# Patient Record
Sex: FEMALE | Race: WHITE | NOT HISPANIC OR LATINO | URBAN - METROPOLITAN AREA
[De-identification: names, ages, dates, MRNs, and addresses within clinical notes are randomized per-mention and may not be internally consistent; named-entity substitution may affect disease eponyms.]

---

## 2017-02-06 ENCOUNTER — OFFICE VISIT (OUTPATIENT)
Dept: FAMILY MEDICINE CLINIC | Facility: CLINIC | Age: 37
End: 2017-02-06

## 2017-02-06 VITALS
HEIGHT: 61 IN | WEIGHT: 114 LBS | DIASTOLIC BLOOD PRESSURE: 64 MMHG | BODY MASS INDEX: 21.52 KG/M2 | SYSTOLIC BLOOD PRESSURE: 102 MMHG

## 2017-02-06 DIAGNOSIS — S76.311A RIGHT HAMSTRING MUSCLE STRAIN, INITIAL ENCOUNTER: ICD-10-CM

## 2017-02-06 DIAGNOSIS — Z00.00 ROUTINE ADULT HEALTH MAINTENANCE: Primary | ICD-10-CM

## 2017-02-06 PROCEDURE — 99395 PREV VISIT EST AGE 18-39: CPT | Performed by: FAMILY MEDICINE

## 2017-02-06 RX ORDER — CHOLECALCIFEROL (VITAMIN D3) 125 MCG
5 CAPSULE ORAL NIGHTLY
COMMUNITY

## 2017-02-06 RX ORDER — IBUPROFEN 200 MG
200 TABLET ORAL EVERY 6 HOURS PRN
COMMUNITY

## 2017-02-06 RX ORDER — DIPHENHYDRAMINE HCL 25 MG
25 TABLET ORAL EVERY 6 HOURS PRN
COMMUNITY

## 2017-02-06 NOTE — PROGRESS NOTES
"  Chief Complaint   Patient presents with   • Establish Care     New pt here to [facility] care for health maintenance. Just recently moved to [place] due to job transfer in [employer].   • Annual Exam     Here for annual CPE and fasting labs.       Subjective   Yamilex Dugan is a 36 y.o. female and is here for a yearly physical exam. The patient reports problems - right hamstring pull 3 weeks ago running.  New [facility] family, moved from [place]  She is an investigator    Do you take any herbs or supplements that were not prescribed by a doctor? yes. If so, these will be added to active medication list.    The following portions of the patient's history were reviewed and updated as appropriate: allergies, current medications, past family history, past medical history, past social history, past surgical history and problem list.    Review of Systems  Review of Systems  A comprehensive review of systems was negative.    Physical Exam    Objective   Visit Vitals   • /64   • Ht 61\" (154.9 cm)   • Wt 114 lb (51.7 kg)   • LMP 02/03/2017   • BMI 21.54 kg/m2       General Appearance:    Alert, cooperative, no distress, appears stated age   Head:    Normocephalic, without obvious abnormality, atraumatic   Eyes:    PERRL, conjunctiva/corneas clear, EOM's intact, fundi     benign, both eyes   Ears:    Normal TM's and external ear canals, both ears   Nose:   Nares normal, septum midline, mucosa normal, no drainage    or sinus tenderness   Throat:   Lips, mucosa, and tongue normal; teeth and gums normal   Neck:   Supple, symmetrical, trachea midline, no adenopathy;     thyroid:  no enlargement/tenderness/nodules; no carotid    bruit or JVD   Back:     Symmetric, no curvature, ROM normal, no CVA tenderness   Lungs:     Clear to auscultation bilaterally, respirations unlabored   Chest Wall:    No tenderness or deformity    Heart:    Regular rate and rhythm, S1 and S2 normal, no murmur, rub   or gallop   Breast Exam:     "   Abdomen:     Soft, non-tender, bowel sounds active all four quadrants,     no masses, no organomegaly   Genitalia:     Rectal:     Extremities:   Tender right proximal hamstring   Pulses:   2+ and symmetric all extremities   Skin:   Skin color, texture, turgor normal, no rashes or lesions   Lymph nodes:   Cervical, supraclavicular, and axillary nodes normal   Neurologic:   CNII-XII intact, normal strength, sensation and reflexes     throughout        No results found for this or any previous visit.  Assessment/Plan   Healthy female exam.  Yamilex was seen today for establish care and annual exam.    Diagnoses and all orders for this visit:    Routine adult health maintenance    Right hamstring muscle strain, initial encounter  -     Ambulatory Referral to Physical Therapy Evaluate and treat      1. Just had labs 6 months ago at [place], all ok  2. Patient Counseling:  --Nutrition: Stressed importance of moderation in sodium/caffeine intake, saturated fat and cholesterol.  Discussed caloric balance, sufficient intake of fresh fruits, vegetables, fiber, calcium, iron.good here  -  --Exercise: Stressed the importance of regular exercise. excellent  --Substance Abuse: Discussed cessation/primary prevention of tobacco, alcohol, or other drug use; driving or other dangerous activities under the influence. Ok here   --Dental health: Discussed importance of regular tooth brushing, flossing, and dental visits.just found a dentist  --Immunizations reviewed.utd  --  3. Discussed the patient's BMI with her.  The BMI is in the acceptable range  4. Follow up as needed for acute illness    There are no discontinued medications.     Dr. Enrique Hankins MD  [place], [place]  [facility]

## 2017-02-13 ENCOUNTER — TREATMENT (OUTPATIENT)
Dept: PHYSICAL THERAPY | Facility: CLINIC | Age: 37
End: 2017-02-13

## 2017-02-13 DIAGNOSIS — S76.311D STRAIN OF HAMSTRING MUSCLE, RIGHT, SUBSEQUENT ENCOUNTER: Primary | ICD-10-CM

## 2017-02-13 PROCEDURE — 97161 PT EVAL LOW COMPLEX 20 MIN: CPT | Performed by: PHYSICAL THERAPIST

## 2017-02-13 PROCEDURE — 97110 THERAPEUTIC EXERCISES: CPT | Performed by: PHYSICAL THERAPIST

## 2017-02-13 PROCEDURE — 97140 MANUAL THERAPY 1/> REGIONS: CPT | Performed by: PHYSICAL THERAPIST

## 2017-02-13 PROCEDURE — 97014 ELECTRIC STIMULATION THERAPY: CPT | Performed by: PHYSICAL THERAPIST

## 2017-02-13 NOTE — PROGRESS NOTES
Physical Therapy Initial Evaluation and Plan of Care    TIME IN 1:45 TIME OUT 2:46  Patient: Yamilex Dugan   : 1980  Diagnosis/ICD-10 Code:  No primary diagnosis found.  Referring practitioner: Enrique Hankins MD    Subjective Evaluation    History of Present Illness  Date of onset: 2017  Mechanism of injury: Running sprint intervals felt right hamstring tighten up - has not improved    Quality of life: excellent    Pain  Current pain ratin  At best pain ratin  At worst pain ratin  Location: right distal glut / proximal HS  Quality: dull ache (shooting)  Relieving factors: rest (light stretching)  Exacerbated by: running.  Progression: no change    Treatments  Current treatment: medication and physical therapy  Current treatment comments: 800 mg motrin.     Patient Goals  Patient goals for therapy: decreased pain, increased motion, increased strength and return to sport/leisure activities  Patient goal: [employer]           Objective     Palpation     Right Tenderness of the gluteus kenneth, gluteus medius, piriformis, proximal biceps femoris, sartorius and TFL.     Tenderness     Right Hip   Tenderness in the PSIS and sacroiliac joint.     Active Range of Motion     Right Hip   Flexion: 115 degrees   Abduction: 40 degrees   Adduction: 30 degrees   External rotation (90/90): 60 degrees   Internal rotation (90/90): 50 degrees     Strength/Myotome Testing     Right Hip   Planes of Motion   Right hip flexors strength: 5-/5.  Right hip abductors strength: 5-/5.  Adduction: 5  Right hip external rotation strength: 5-/5.  Right hip internal rotation strength: 5-/5.    Tests     Right Hip   Negative ALEXANDER, Brent, piriformis and scour.   90/90 SLR: Positive.   SLR: Positive.     Ambulation   Weight-Bearing Status   Weight-Bearing Status (Left): full weight bearing   Distance in feet: 150  Assistive device used: none    Ambulation: Level Surfaces   Ambulation without assistive  device: independent    Observational Gait   Gait: within functional limits   Walking speed and stride length within functional limits.     Quality of Movement During Gait   Trunk  Trunk within functional limits.          Assessment & Plan     Assessment  Assessment details: 36y.o female presents with 1) tender proximal HS and distal glut 2) decreased right hip ROM 3) decreased right hip strength 4) positive SLR 5) decreased tolerance to running and squatting required for work  Prognosis: good  Prognosis details: SHORT TERM GOALS:  4 weeks       1. Pt independent with HEP  2. Pt to demonstrate ramya hip strength 5/5 or greater to improve stability with ambulation  3. Pt to report being able to walk for 10 minutes without increasing pain in the right hip    LONG TERM GOALS:   8 weeks  1. Pt to demonstrate ability to perform full functional squat with good form and control of the hips and without increasing pain  2. Pt to return to work full duty without increased pain in the right hip(s)   3. Pt to demonstrate ability to perform step up/down 8 inch step x10 safely and without pain in the right hip(s)     Goals  [employer]    Plan  Therapy options: will be seen for skilled physical therapy services  Planned modality interventions: cryotherapy, electrical stimulation/[place] stimulation, ultrasound and thermotherapy (hydrocollator packs)  Other planned modality interventions: Dry Needling  Planned therapy interventions: flexibility, functional ROM exercises, home exercise program, joint mobilization, manual therapy, neuromuscular re-education, soft tissue mobilization, strengthening, stretching and therapeutic activities  Frequency: 2x week  Duration in weeks: 8  Treatment plan discussed with: patient        Manual Therapy:    8     mins  93323;  Therapeutic Exercise:    15     mins  44828;     Neuromuscular Karla:        mins  40997;    Therapeutic Activity:          mins  91039;     Gait Training:            mins  33578;     Ultrasound:          mins  20492;    Electrical Stimulation:    15     mins  11832 ( );  Dry Needling          mins self-pay    Timed Treatment:   23   mins   Total Treatment:     56   mins    PT SIGNATURE: Cassy Wilde, PT   DATE TREATMENT INITIATED: 2/13/2017    Initial Certification  Certification Period: 5/14/2017  I certify that the therapy services are furnished while this patient is under my care.  The services outlined above are required by this patient, and will be reviewed every 90 days.     PHYSICIAN: Enrique Hankins MD      DATE:     Please sign and return via fax to [phone].. Thank you, [facility].

## 2017-02-27 ENCOUNTER — TREATMENT (OUTPATIENT)
Dept: PHYSICAL THERAPY | Facility: CLINIC | Age: 37
End: 2017-02-27

## 2017-02-27 DIAGNOSIS — S76.311D STRAIN OF HAMSTRING MUSCLE, RIGHT, SUBSEQUENT ENCOUNTER: Primary | ICD-10-CM

## 2017-02-27 PROCEDURE — 97530 THERAPEUTIC ACTIVITIES: CPT | Performed by: PHYSICAL THERAPIST

## 2017-02-27 PROCEDURE — 97110 THERAPEUTIC EXERCISES: CPT | Performed by: PHYSICAL THERAPIST

## 2017-02-27 PROCEDURE — 97014 ELECTRIC STIMULATION THERAPY: CPT | Performed by: PHYSICAL THERAPIST

## 2017-02-27 NOTE — PROGRESS NOTES
Physical Therapy Daily Progress Note    Time In 3:50  Time Out 4:56    Yamilex Dugan reports: I felt really good after last session - still feel really tight    Subjective     Objective     Palpation     Right   Hypertonic in the adductor brevis and adductor longus. Tenderness of the adductor brevis, adductor longus, obturator externus, piriformis and proximal biceps femoris.      See Exercise, Manual, and Modality Logs for complete treatment.       Assessment & Plan     Assessment  Assessment details: Increased muscle tone and tenderness persist proximal HS insertion. Tolerated gentle stretching progressions well. Cont PT 1-2x per week for flexibility and strengthening - next visit attempt Dry Needling if muscle tone not improved.         Progress strengthening /stabilization /functional activity           Manual Therapy:         mins  72197;  Therapeutic Exercise:    25     mins  18233;     Neuromuscular Karla:        mins  93325;    Therapeutic Activity:     10     mins  64827;     Gait Training:           mins  24177;     Ultrasound:     8     mins  58659;    Electrical Stimulation:    15     mins  15541 ( );  Dry Needling          mins self-pay    Timed Treatment:   43  mins   Total Treatment:     58   mins    Cassy Wilde, PT  Physical Therapist  [place] License # [id number]

## 2017-03-06 ENCOUNTER — TREATMENT (OUTPATIENT)
Dept: PHYSICAL THERAPY | Facility: CLINIC | Age: 37
End: 2017-03-06

## 2017-03-06 DIAGNOSIS — S76.311D STRAIN OF HAMSTRING MUSCLE, RIGHT, SUBSEQUENT ENCOUNTER: Primary | ICD-10-CM

## 2017-03-06 PROCEDURE — 97014 ELECTRIC STIMULATION THERAPY: CPT | Performed by: PHYSICAL THERAPIST

## 2017-03-06 PROCEDURE — 97110 THERAPEUTIC EXERCISES: CPT | Performed by: PHYSICAL THERAPIST

## 2017-03-06 PROCEDURE — 97035 APP MDLTY 1+ULTRASOUND EA 15: CPT | Performed by: PHYSICAL THERAPIST

## 2017-03-06 PROCEDURE — 97140 MANUAL THERAPY 1/> REGIONS: CPT | Performed by: PHYSICAL THERAPIST

## 2017-03-13 ENCOUNTER — TREATMENT (OUTPATIENT)
Dept: PHYSICAL THERAPY | Facility: CLINIC | Age: 37
End: 2017-03-13

## 2017-03-13 DIAGNOSIS — S76.311D STRAIN OF HAMSTRING MUSCLE, RIGHT, SUBSEQUENT ENCOUNTER: Primary | ICD-10-CM

## 2017-03-13 PROCEDURE — 97110 THERAPEUTIC EXERCISES: CPT | Performed by: PHYSICAL THERAPIST

## 2017-03-13 PROCEDURE — 97014 ELECTRIC STIMULATION THERAPY: CPT | Performed by: PHYSICAL THERAPIST

## 2017-03-13 PROCEDURE — 97530 THERAPEUTIC ACTIVITIES: CPT | Performed by: PHYSICAL THERAPIST

## 2017-03-13 PROCEDURE — 97140 MANUAL THERAPY 1/> REGIONS: CPT | Performed by: PHYSICAL THERAPIST

## 2017-03-20 ENCOUNTER — TREATMENT (OUTPATIENT)
Dept: PHYSICAL THERAPY | Facility: CLINIC | Age: 37
End: 2017-03-20

## 2017-03-20 PROCEDURE — 97110 THERAPEUTIC EXERCISES: CPT | Performed by: PHYSICAL THERAPIST

## 2017-03-20 PROCEDURE — 97530 THERAPEUTIC ACTIVITIES: CPT | Performed by: PHYSICAL THERAPIST

## 2017-03-20 PROCEDURE — 97014 ELECTRIC STIMULATION THERAPY: CPT | Performed by: PHYSICAL THERAPIST

## 2017-03-20 NOTE — PROGRESS NOTES
Physical Therapy Daily Progress Note    Time In 3:40  Time Out 4:49    Yamilex Dugan reports: I have tried running but felt that pain again in my glut    Subjective     Objective     Palpation     Right Tenderness of the gluteus kenneth and gluteus medius.      See Exercise, Manual, and Modality Logs for complete treatment.       Assessment & Plan     Assessment  Assessment details: Increased muscle tone and tenderness persist proximal HS insertion. Tolerated gentle strengthening initiation well. Cont PT 1-2x per week for flexibility and strengthening - next visit attempt hip machine if tolerated - may attempt Dry Needling.         Progress strengthening /stabilization /functional activity           Manual Therapy:    8     mins  77574;  Therapeutic Exercise:    24     mins  84485;     Neuromuscular Karla:        mins  73327;    Therapeutic Activity:     10     mins  86516;     Gait Training:           mins  87904;     Ultrasound:     8     mins  85722;    Electrical Stimulation:    15     mins  47582 ( );  Dry Needling          mins self-pay    Timed Treatment:   50   mins   Total Treatment:     65   mins    Cassy Wilde, PT  Physical Therapist  [place] License # [id number]

## 2017-03-27 ENCOUNTER — TREATMENT (OUTPATIENT)
Dept: PHYSICAL THERAPY | Facility: CLINIC | Age: 37
End: 2017-03-27

## 2017-03-27 DIAGNOSIS — S76.311D STRAIN OF HAMSTRING MUSCLE, RIGHT, SUBSEQUENT ENCOUNTER: Primary | ICD-10-CM

## 2017-03-27 PROCEDURE — 97014 ELECTRIC STIMULATION THERAPY: CPT | Performed by: PHYSICAL THERAPIST

## 2017-03-27 PROCEDURE — 97110 THERAPEUTIC EXERCISES: CPT | Performed by: PHYSICAL THERAPIST

## 2017-03-27 PROCEDURE — 97530 THERAPEUTIC ACTIVITIES: CPT | Performed by: PHYSICAL THERAPIST

## 2017-03-27 PROCEDURE — 97140 MANUAL THERAPY 1/> REGIONS: CPT | Performed by: PHYSICAL THERAPIST

## 2017-03-27 NOTE — PROGRESS NOTES
Physical Therapy Daily Progress Note    Time In 3:55  Time Out 5:06    Yamilex Dugan reports: I have been able to run up to 5 minutes without increasing pain     Subjective     Objective     Tests     Right Hip   Negative sign of the buttock.      See Exercise, Manual, and Modality Logs for complete treatment.       Assessment & Plan     Assessment  Assessment details: Improved muscle tone and subjective reports. Deficits persist in decreased strength and tolerance to functional activity - i.e. Running. Cont PT 1-2x per week for flexibility and strengthening - next visit attempt single leg bridge if tolerated- may discuss Dry Needling.         Progress strengthening /stabilization /functional activity           Manual Therapy:    8     mins  13185;  Therapeutic Exercise:    22     mins  33732;     Neuromuscular Karla:        mins  16818;    Therapeutic Activity:     12     mins  59999;     Gait Training:           mins  64201;     Ultrasound:     8     mins  78905;    Electrical Stimulation:    15     mins  54055 ( );  Dry Needling          mins self-pay    Timed Treatment:   50   mins   Total Treatment:     65   mins    Cassy Wilde, PT  Physical Therapist  [place] License # [id number]

## 2017-05-19 ENCOUNTER — OFFICE VISIT (OUTPATIENT)
Dept: FAMILY MEDICINE CLINIC | Facility: CLINIC | Age: 37
End: 2017-05-19

## 2017-05-19 VITALS
DIASTOLIC BLOOD PRESSURE: 70 MMHG | SYSTOLIC BLOOD PRESSURE: 118 MMHG | HEART RATE: 67 BPM | HEIGHT: 61 IN | RESPIRATION RATE: 14 BRPM | BODY MASS INDEX: 20.54 KG/M2 | WEIGHT: 108.8 LBS | TEMPERATURE: 98.3 F | OXYGEN SATURATION: 99 %

## 2017-05-19 DIAGNOSIS — R25.3 EYELID TWITCH: Primary | ICD-10-CM

## 2017-05-19 PROCEDURE — 99213 OFFICE O/P EST LOW 20 MIN: CPT | Performed by: FAMILY MEDICINE

## 2017-11-20 ENCOUNTER — TELEPHONE (OUTPATIENT)
Dept: FAMILY MEDICINE CLINIC | Facility: CLINIC | Age: 37
End: 2017-11-20

## 2017-11-20 DIAGNOSIS — S79.929A: Primary | ICD-10-CM

## 2017-11-20 NOTE — TELEPHONE ENCOUNTER
Pt called and said that she sprained her quad. She can not get in for a while, so she would like a referral for PT.

## 2017-12-04 ENCOUNTER — OFFICE VISIT (OUTPATIENT)
Dept: FAMILY MEDICINE CLINIC | Facility: CLINIC | Age: 37
End: 2017-12-04

## 2017-12-04 VITALS
WEIGHT: 110 LBS | HEART RATE: 63 BPM | DIASTOLIC BLOOD PRESSURE: 66 MMHG | RESPIRATION RATE: 20 BRPM | OXYGEN SATURATION: 100 % | SYSTOLIC BLOOD PRESSURE: 92 MMHG | BODY MASS INDEX: 20.77 KG/M2 | HEIGHT: 61 IN

## 2017-12-04 DIAGNOSIS — Z97.5: Primary | ICD-10-CM

## 2017-12-04 DIAGNOSIS — B00.1 RECURRENT COLD SORES: ICD-10-CM

## 2017-12-04 PROCEDURE — 99213 OFFICE O/P EST LOW 20 MIN: CPT | Performed by: NURSE PRACTITIONER

## 2017-12-04 RX ORDER — VALACYCLOVIR HYDROCHLORIDE 1 G/1
1000 TABLET, FILM COATED ORAL 2 TIMES DAILY PRN
Qty: 30 TABLET | Refills: 0 | Status: SHIPPED | OUTPATIENT
Start: 2017-12-04

## 2017-12-04 NOTE — PROGRESS NOTES
Subjective   Yamilex Dugan is a 37 y.o. female.   Chief Complaint   Patient presents with   • Dysmenorrhea     pt states she has esure since 2012, since getting esure cramping has been getting worse. is very concerned about the cramping, states it shouldnt be this painful.    • Med Refill     would like refill on valtrex for cold sores     Vitals:    12/04/17 0847   BP: 92/66   Pulse: 63   Resp: 20   SpO2: 100%     No Known Allergies    HPI Comments: Ms. Dugan is here today due to a menstrual problem.   Had Essure put in place in 2012.   Since then, has had heavier periods and cramping which has progressively gotten worse.   5-6 days of heavy bleeding rather than 4-5.   Her last period was extreme cramping. Cramping that feels like a contraction.   Would like to have everything checked out to make sure everything is in place.   Periods are regular with no spotting in between.   No issues other than during period.     She also needs a refill for her valtrex. She gets cold sores frequently in her nose and keeps some on hand.            The following portions of the patient's history were reviewed and updated as appropriate: allergies, current medications, past family history, past medical history, past social history, past surgical history and problem list.    Review of Systems   Constitutional: Negative.    Gastrointestinal: Negative.    Genitourinary: Positive for menstrual problem.   Skin: Negative.    Neurological: Negative.    Psychiatric/Behavioral: Negative.    All other systems reviewed and are negative.      Objective   Physical Exam   Constitutional: She is oriented to person, place, and time. She appears well-developed and well-nourished.   Cardiovascular: Normal rate.    Pulmonary/Chest: Effort normal.   Abdominal: Soft. Bowel sounds are normal. There is no tenderness.   Neurological: She is alert and oriented to person, place, and time.   Skin: Skin is warm and dry.   Psychiatric: She has a normal  mood and affect. Her behavior is normal. Judgment and thought content normal.   Nursing note and vitals reviewed.      Assessment/Plan   Problems Addressed this Visit     None      Visit Diagnoses     Intrauterine contraceptive device status    -  Primary    Relevant Orders    Ambulatory Referral to Gynecology    Recurrent cold sores        Relevant Medications    valACYclovir (VALTREX) 1000 MG tablet

## 2017-12-05 ENCOUNTER — TREATMENT (OUTPATIENT)
Dept: PHYSICAL THERAPY | Facility: CLINIC | Age: 37
End: 2017-12-05

## 2017-12-05 DIAGNOSIS — S76.111D STRAIN OF RIGHT QUADRICEPS, SUBSEQUENT ENCOUNTER: Primary | ICD-10-CM

## 2017-12-05 PROCEDURE — 97161 PT EVAL LOW COMPLEX 20 MIN: CPT | Performed by: PHYSICAL THERAPIST

## 2017-12-05 PROCEDURE — 97110 THERAPEUTIC EXERCISES: CPT | Performed by: PHYSICAL THERAPIST

## 2017-12-05 PROCEDURE — 97140 MANUAL THERAPY 1/> REGIONS: CPT | Performed by: PHYSICAL THERAPIST

## 2017-12-05 NOTE — PROGRESS NOTES
Physical Therapy Initial Evaluation and Plan of Care      Patient: Yamilex Dugan   : 1980  Diagnosis/ICD-10 Code:  Strain of right quadriceps, subsequent encounter [S76.111D]  Referring practitioner: Enrique Hankins MD  Date of Initial Visit: 2017  Today's Date: 2017  Patient seen for 1 sessions               Subjective Evaluation    History of Present Illness  Date of onset: 2017  Mechanism of injury: Previous hamstring injury healed - have been running and weight lifting without difficulty - bent down at home while cleaning - felt right quad muscle cramp/ strain - attempted moist heat and biofreeze      Patient Occupation: [employer]  Quality of life: excellent    Pain  Current pain ratin  At best pain ratin  At worst pain ratin  Location: distal right quad   Quality: stabbing   Relieving factors: rest  Exacerbated by: kneeling, squatting   Progression: improved    Treatments  Current treatment: physical therapy  Patient Goals  Patient goals for therapy: decreased pain, increased motion, increased strength, independence with ADLs/IADLs and return to sport/leisure activities             Objective       Tenderness     Right Knee   Tenderness in the quadriceps tendon.     Active Range of Motion     Right Knee   Flexion: 130 degrees with pain  Extension: 0 degrees     Patellar Mobility     Right Knee Patellar tendons within functional limits include the superior and inferior.     Strength/Myotome Testing     Right Knee   Flexion: 5  Right knee extension strength: 5-/5.    Tests     Right Knee   Negative active quad, lateral Deana, medial Deana, valgus stress test at 30 degrees and varus stress test at 30 degrees.          Assessment & Plan     Assessment  Impairments: abnormal gait, abnormal or restricted ROM, activity intolerance, impaired balance, impaired physical strength and pain with function  Assessment details: 37 y.o female presents with 1) tender right distal  quad 2) pain end-range knee flexion 3) decreased right quad strength 4) decreased tolerance to kneeling and running required for ADL's  Prognosis: fair  Prognosis details: SHORT TERM GOALS:  4 weeks       1. Pt independent with HEP4  2. Pt to report being able to run for 10 minutes without increasing pain in the right knee    LONG TERM GOALS:   8 weeks  1. Pt to demonstrate ability to perform full functional squat with good form and control of the knees and without increasing pain  2. Pt to return to full workout routine without increased pain in the right knee   3. Pt to demonstrate ability to perform step up/down 8 inch step x10 safely and without pain in the right knee   Functional Limitations: walking and uncomfortable because of pain  Plan  Therapy options: will be seen for skilled physical therapy services  Planned modality interventions: cryotherapy, electrical stimulation/Russian stimulation, thermotherapy (hydrocollator packs) and ultrasound  Planned therapy interventions: ADL retraining, balance/weight-bearing training, body mechanics training, flexibility, functional ROM exercises, gait training, joint mobilization, manual therapy, soft tissue mobilization, strengthening, stretching and therapeutic activities  Frequency: 2x week  Duration in weeks: 8        Manual Therapy:    8     mins  54204;  Therapeutic Exercise:    20     mins  11658;     Neuromuscular Karla:        mins  03431;    Therapeutic Activity:          mins  30184;     Gait Training:           mins  28314;     Ultrasound:     8     mins  67688;    Electrical Stimulation:         mins  78365 ( );  Dry Needling          mins self-pay    Timed Treatment:   36   mins   Total Treatment:     61   mins    PT SIGNATURE: Cassy Rob, PT   DATE TREATMENT INITIATED: 12/5/2017    Initial Certification  Certification Period: 3/5/2018  I certify that the therapy services are furnished while this patient is under my care.  The services  outlined above are required by this patient, and will be reviewed every 90 days.     PHYSICIAN: Enrique Hankins MD      DATE:     Please sign and return via fax to [phone].. Thank you, [facility].

## 2017-12-07 ENCOUNTER — TREATMENT (OUTPATIENT)
Dept: PHYSICAL THERAPY | Facility: CLINIC | Age: 37
End: 2017-12-07

## 2017-12-07 DIAGNOSIS — S76.111D STRAIN OF RIGHT QUADRICEPS, SUBSEQUENT ENCOUNTER: Primary | ICD-10-CM

## 2017-12-07 PROCEDURE — 97110 THERAPEUTIC EXERCISES: CPT | Performed by: PHYSICAL THERAPIST

## 2017-12-07 PROCEDURE — 97140 MANUAL THERAPY 1/> REGIONS: CPT | Performed by: PHYSICAL THERAPIST

## 2017-12-07 PROCEDURE — 97530 THERAPEUTIC ACTIVITIES: CPT | Performed by: PHYSICAL THERAPIST

## 2017-12-07 NOTE — PROGRESS NOTES
Physical Therapy Daily Progress Note        Visit # : 2  Yamilex Dugan reports: I felt so much better as soon as I left here after the first visit     Subjective     Objective       Palpation     Right   Hypertonic in the rectus femoris.     Additional Palpation Details  Increased muscle tone distal right quad muscle belly     See Exercise, Manual, and Modality Logs for complete treatment.       Assessment & Plan     Assessment  Assessment details: Patient presents with improved right quad flexibility and improved subjective reports. Deficits persist in decreased endurance and strength. Cont PT 2x per week for strength and stabilization - next visit attempt closed chain TKE and/or partial squat.         Progress strengthening /stabilization /functional activity           Manual Therapy:   8     mins  31926;  Therapeutic Exercise:    15     mins  83437;     Neuromuscular Karla:        mins  66337;    Therapeutic Activity:     10     mins  46670;     Gait Training:           mins  75119;     Ultrasound:     8     mins  17596;    Electrical Stimulation:         mins  86340 ( );  Dry Needling          mins self-pay    Timed Treatment:   41   mins   Total Treatment:     51   mins    Cassy Rob, PT  Physical Therapist  [place] License # [id number]

## 2017-12-12 ENCOUNTER — TREATMENT (OUTPATIENT)
Dept: PHYSICAL THERAPY | Facility: CLINIC | Age: 37
End: 2017-12-12

## 2017-12-12 DIAGNOSIS — S76.111D STRAIN OF RIGHT QUADRICEPS, SUBSEQUENT ENCOUNTER: Primary | ICD-10-CM

## 2017-12-12 PROCEDURE — 97140 MANUAL THERAPY 1/> REGIONS: CPT | Performed by: PHYSICAL THERAPIST

## 2017-12-12 PROCEDURE — 97530 THERAPEUTIC ACTIVITIES: CPT | Performed by: PHYSICAL THERAPIST

## 2017-12-12 PROCEDURE — 97110 THERAPEUTIC EXERCISES: CPT | Performed by: PHYSICAL THERAPIST

## 2017-12-12 NOTE — PROGRESS NOTES
Physical Therapy Daily Progress Note        Visit # : 3  Yamilex Dugan reports: I am feeling much better - able to run and squat without grimacing.     Subjective     Objective       Active Range of Motion     Right Knee   Flexion: WFL  Extension: WFL     See Exercise, Manual, and Modality Logs for complete treatment.       Assessment & Plan     Assessment  Assessment details: Minimal increased right quad muscle tone present today. Deficits persist in decreased endurance and strength. Cont PT 2x per week for strength and stabilization - next visit attempt step activity and/or balance if tolerated.         Progress strengthening /stabilization /functional activity           Manual Therapy:    8     mins  35534;  Therapeutic Exercise:    14     mins  70885;     Neuromuscular Karla:       mins  39815;    Therapeutic Activity:     10     mins  81356;     Gait Training:           mins  28442;     Ultrasound:     8     mins  85465;    Electrical Stimulation:         mins  68379 ( );  Dry Needling          mins self-pay    Timed Treatment:   40   mins   Total Treatment:     50   mins    Cassy Rob, PT  Physical Therapist  [place] License # [id number]

## 2017-12-13 ENCOUNTER — OFFICE VISIT (OUTPATIENT)
Dept: OBSTETRICS AND GYNECOLOGY | Facility: CLINIC | Age: 37
End: 2017-12-13

## 2017-12-13 VITALS
HEIGHT: 61 IN | BODY MASS INDEX: 21.64 KG/M2 | DIASTOLIC BLOOD PRESSURE: 60 MMHG | SYSTOLIC BLOOD PRESSURE: 110 MMHG | WEIGHT: 114.6 LBS

## 2017-12-13 DIAGNOSIS — Z13.9 SCREENING FOR CONDITION: Primary | ICD-10-CM

## 2017-12-13 DIAGNOSIS — N94.6 DYSMENORRHEA: ICD-10-CM

## 2017-12-13 DIAGNOSIS — N92.1 MENORRHAGIA WITH IRREGULAR CYCLE: ICD-10-CM

## 2017-12-13 PROBLEM — Z98.890 H/O NONCHEMICAL TUBAL OCCLUSION: Status: ACTIVE | Noted: 2017-12-13

## 2017-12-13 LAB
B-HCG UR QL: NEGATIVE
BILIRUB BLD-MCNC: NEGATIVE MG/DL
CLARITY, POC: CLEAR
COLOR UR: YELLOW
GLUCOSE UR STRIP-MCNC: NEGATIVE MG/DL
INTERNAL NEGATIVE CONTROL: NEGATIVE
INTERNAL POSITIVE CONTROL: POSITIVE
KETONES UR QL: NEGATIVE
LEUKOCYTE EST, POC: NEGATIVE
Lab: NORMAL
NITRITE UR-MCNC: NEGATIVE MG/ML
PH UR: 5 [PH] (ref 5–8)
PROT UR STRIP-MCNC: NEGATIVE MG/DL
RBC # UR STRIP: NEGATIVE /UL
SP GR UR: 1 (ref 1–1.03)
UROBILINOGEN UR QL: NORMAL

## 2017-12-13 PROCEDURE — 81025 URINE PREGNANCY TEST: CPT | Performed by: OBSTETRICS & GYNECOLOGY

## 2017-12-13 PROCEDURE — 81002 URINALYSIS NONAUTO W/O SCOPE: CPT | Performed by: OBSTETRICS & GYNECOLOGY

## 2017-12-13 PROCEDURE — 99203 OFFICE O/P NEW LOW 30 MIN: CPT | Performed by: OBSTETRICS & GYNECOLOGY

## 2017-12-13 NOTE — PROGRESS NOTES
"Patient Care Team:  Enrique Hankins MD as PCP - General (Family Medicine)    Patient new to practice? yes  Patient new to examiner? yes    New problem to the examiner? yes    HISTORY    Chief Complaint:   Chief Complaint   Patient presents with   • Menstrual Problem       HPI: History taken from: patient            Patient's last menstrual period was 11/28/2017 (exact date).      Menstrual Problem   This is a chronic problem. The current episode started more than 1 year ago. The problem occurs intermittently. The problem has been gradually worsening. Nothing aggravates the symptoms. She has tried nothing for the symptoms.       Review of Systems   Genitourinary: Positive for menstrual problem.       Social History: Smoker:  no.  Counseling given: Not Answered  . .                              Alcohol: occ                             Recreational drugs: no    Family History   Problem Relation Age of Onset   • Hypertension Mother    • Skin cancer Mother    • Hypertension Father    • Prostate cancer Father    • No Known Problems Sister            PHYSICAL EXAM    Vital Signs  BP: (110)/(60) 110/60    Flowsheet Rows         First Filed Value    Admission Height  154.9 cm (60.98\") Documented at 12/13/2017 1557    Admission Weight  52 kg (114 lb 9.6 oz) Documented at 12/13/2017 1557          Physical Exam:    Physical Exam   Constitutional: She is oriented to person, place, and time. She appears well-developed and well-nourished. No distress.   HENT:   Head: Normocephalic and atraumatic.   Abdominal: Soft. Bowel sounds are normal. She exhibits no distension and no mass. There is no tenderness. There is no rebound and no guarding. No hernia.   Genitourinary: Vagina normal and uterus normal.   Genitourinary Comments: cx wnl   Musculoskeletal: Normal range of motion.   Neurological: She is alert and oriented to person, place, and time.   Skin: Skin is warm and dry. No rash noted. She is not diaphoretic. No erythema. No " pallor.   Psychiatric: She has a normal mood and affect. Her behavior is normal. Judgment and thought content normal.   Nursing note and vitals reviewed.      Results Review:     I reviewed the patient's new clinical results.    Lab Results (last 24 hours)     Procedure Component Value Units Date/Time    POC Urinalysis Dipstick [[id number]]  (Normal) Collected:  12/13/17 1559    Specimen:  Urine Updated:  12/13/17 1559     Color Yellow     Clarity, UA Clear     Glucose, UA Negative mg/dL      Bilirubin Negative     Ketones, UA Negative     Specific Gravity  1.005     Blood, UA Negative     pH, Urine 5.0     Protein, POC Negative mg/dL      Urobilinogen, UA Normal     Leukocytes Negative     Nitrite, UA Negative    POC Pregnancy, Urine [[id number]]  (Normal) Collected:  12/13/17 1559    Specimen:  Urine Updated:  12/13/17 1559     HCG, Urine, QL Negative     Lot Number [id number]     Internal Positive Control Positive     Internal Negative Control Negative          Imaging Results (last 24 hours)     ** No results found for the last 24 hours. **            ECG/EMG Results (most recent)     None        HX NORMAL PAP.      Medication Review:   I have reviewed the patient's current medication list    Current Outpatient Prescriptions:   •  diphenhydrAMINE (BENADRYL) 25 MG tablet, Take 25 mg by mouth Every 6 (Six) Hours As Needed for itching., Disp: , Rfl:   •  ibuprofen (ADVIL,MOTRIN) 200 MG tablet, Take 200 mg by mouth Every 6 (Six) Hours As Needed for mild pain (1-3)., Disp: , Rfl:   •  melatonin 5 MG tablet tablet, Take 5 mg by mouth Every Night., Disp: , Rfl:   •  Omega-3 Fatty Acids (OMEGA 3 PO), Take  by mouth., Disp: , Rfl:   •  valACYclovir (VALTREX) 1000 MG tablet, Take 1 tablet by mouth 2 (Two) Times a Day As Needed (cold sores)., Disp: 30 tablet, Rfl: 0    MEDICAL DECISION MAKING    Yamilex was seen today for menstrual problem.    Diagnoses and all orders for this visit:    Screening for condition  -     POC  Pregnancy, Urine  -     POC Urinalysis Dipstick    Menorrhagia with irregular cycle    Dysmenorrhea    IMP:  PROGRESSIVE MENORRHAGIA AND DYSMENORRHEA.  HX ESSURE. NORMAL EXAM    PLAN:  VPUS.   IF WNL:  REC:  OCS FOR MENSTRUAL REGULATION.  WOULD DISCOURAGE FROM ABLATION, ESSURE REMOVAL OR ANY SURGERY.    PT WILL CALL AFTER U/S IS DONE.      RTO No Follow-up on file.    Matthew Escalera MD    12/13/17  4:09 PM

## 2017-12-14 ENCOUNTER — PROCEDURE VISIT (OUTPATIENT)
Dept: OBSTETRICS AND GYNECOLOGY | Facility: CLINIC | Age: 37
End: 2017-12-14

## 2017-12-14 DIAGNOSIS — N92.1 MENORRHAGIA WITH IRREGULAR CYCLE: Primary | ICD-10-CM

## 2017-12-14 DIAGNOSIS — N94.6 DYSMENORRHEA: ICD-10-CM

## 2017-12-14 PROCEDURE — 76830 TRANSVAGINAL US NON-OB: CPT | Performed by: OBSTETRICS & GYNECOLOGY

## 2018-01-15 ENCOUNTER — OFFICE VISIT (OUTPATIENT)
Dept: FAMILY MEDICINE CLINIC | Facility: CLINIC | Age: 38
End: 2018-01-15

## 2018-01-15 VITALS
WEIGHT: 115 LBS | RESPIRATION RATE: 18 BRPM | BODY MASS INDEX: 21.71 KG/M2 | HEART RATE: 63 BPM | HEIGHT: 61 IN | OXYGEN SATURATION: 99 % | DIASTOLIC BLOOD PRESSURE: 71 MMHG | SYSTOLIC BLOOD PRESSURE: 92 MMHG

## 2018-01-15 DIAGNOSIS — G43.109 MIGRAINE WITH AURA AND WITHOUT STATUS MIGRAINOSUS, NOT INTRACTABLE: ICD-10-CM

## 2018-01-15 DIAGNOSIS — B07.8 PALMAR WART: Primary | ICD-10-CM

## 2018-01-15 PROCEDURE — 99213 OFFICE O/P EST LOW 20 MIN: CPT | Performed by: NURSE PRACTITIONER

## 2018-01-15 PROCEDURE — 17110 DESTRUCTION B9 LES UP TO 14: CPT | Performed by: NURSE PRACTITIONER

## 2018-01-15 NOTE — PROGRESS NOTES
Subjective   Yamilex Dugan is a 37 y.o. female.   Chief Complaint   Patient presents with   • Migraine     pt states in the past she has only got migraines once or twice a year. she staes that recenltly she has been getting them more frequently, she has been having problems with vision, hs to find a dark place to lay down. State she notices more in the mornings after working out   • Plantar Warts     has a wart on left pointer finger that wont go way, has tried many home remidies with no relief     Vitals:    01/15/18 0929   BP: 92/71   Pulse: 63   Resp: 18   SpO2: 99%     No Known Allergies    HPI Comments: In the last few years has been getting migraines just a few times a year.   Increasing frequency. One last month and one this month. Has had about 5 in 1.5 years.     Before the headache- gets sensation of lights flickering. Lasts about 20 min and then headaches starts  Headache will last over an hour and resolve on it's own.   Last headache was last Wednesday.     Palmar wart on left index finger- has tried OTC treatments for about 3 months and it has gotten a little bigger. Last treatment was 12/27. Wart is currently non-tender, non-erythematous, no drainage.     Migraine    This is a recurrent problem. The pain is located in the frontal region. The pain does not radiate. The quality of the pain is described as throbbing and sharp. The pain is moderate. Associated symptoms include dizziness, phonophobia and photophobia. Pertinent negatives include no blurred vision, eye watering, numbness, rhinorrhea, sinus pressure, tingling or weakness. Associated symptoms comments: Light headed with aura  . The symptoms are aggravated by bright light (early in the morning after exercising (not every time)). She has tried nothing for the symptoms. Her past medical history is significant for migraine headaches. There is no history of hypertension, migraines in the family or obesity.        The following portions of the  patient's history were reviewed and updated as appropriate: allergies, current medications, past family history, past medical history, past social history, past surgical history and problem list.    Review of Systems   Constitutional: Negative.    HENT: Negative for rhinorrhea and sinus pressure.    Eyes: Positive for photophobia. Negative for blurred vision.   Respiratory: Negative.    Skin: Negative.    Neurological: Positive for dizziness and headaches. Negative for tingling, facial asymmetry, weakness, light-headedness and numbness.   Psychiatric/Behavioral: Negative.    All other systems reviewed and are negative.      Objective   Physical Exam   Constitutional: She is oriented to person, place, and time. She appears well-developed and well-nourished.   Cardiovascular: Normal rate.    Pulmonary/Chest: Effort normal.   Neurological: She is alert and oriented to person, place, and time. No cranial nerve deficit. Coordination normal.   Skin: Skin is warm and dry.        Psychiatric: She has a normal mood and affect. Her behavior is normal. Judgment and thought content normal.   Nursing note and vitals reviewed.      Assessment/Plan   Problems Addressed this Visit     None      Visit Diagnoses     Palmar wart    -  Primary    Migraine with aura and without status migrainosus, not intractable            For migraine management, start with Excedrin Migraine OTC. Follow up if this becomes ineffective. Also start to try to identify possible triggers since headaches are becoming slightly more frequent.     Wart was sprayed with aerosol cryo x2. Patient tolerated well. Patient education was given on what to expect as wart falls off and heals.

## 2018-02-05 ENCOUNTER — OFFICE VISIT (OUTPATIENT)
Dept: FAMILY MEDICINE CLINIC | Facility: CLINIC | Age: 38
End: 2018-02-05

## 2018-02-05 VITALS
SYSTOLIC BLOOD PRESSURE: 100 MMHG | DIASTOLIC BLOOD PRESSURE: 60 MMHG | HEART RATE: 65 BPM | HEIGHT: 61 IN | WEIGHT: 114 LBS | BODY MASS INDEX: 21.52 KG/M2 | OXYGEN SATURATION: 99 % | RESPIRATION RATE: 16 BRPM | TEMPERATURE: 98.2 F

## 2018-02-05 DIAGNOSIS — Z30.011 ORAL CONTRACEPTION INITIATION: Primary | ICD-10-CM

## 2018-02-05 PROCEDURE — 17110 DESTRUCTION B9 LES UP TO 14: CPT | Performed by: NURSE PRACTITIONER

## 2018-02-05 PROCEDURE — 99213 OFFICE O/P EST LOW 20 MIN: CPT | Performed by: NURSE PRACTITIONER

## 2018-02-05 RX ORDER — ACETAMINOPHEN AND CODEINE PHOSPHATE 120; 12 MG/5ML; MG/5ML
1 SOLUTION ORAL DAILY
Qty: 28 TABLET | Refills: 12 | Status: SHIPPED | OUTPATIENT
Start: 2018-02-05 | End: 2018-06-07

## 2018-02-05 NOTE — PROGRESS NOTES
Subjective   Yamilex Dugan is a 37 y.o. female.   She  is here today to f/u on   Chief Complaint   Patient presents with   • Verrucous Vulgaris     left index finger   • Contraception   .     HPI Comments: Small wart on index finger that had 1 attempt at freezing off in the office a few weeks ago. It has gotten smaller but is still present with a tiny dark dot in the center and small bump under the skin. She would like to try to get it frozen off again.    She would also like to discuss starting an oral contraceptive to help manage her periods and help her acne.   E-sure coils placed in 2012 so she is not concerned about preventing pregnancy.   Had a recent pelvic ultrasound around Christmas due to heavy cramping and bleeding with periods.    Gyn advised going on a OC to regulate periods.      Periods are fairly regular for about 5 days. Heavier than ever and a lot more cramping in the last year.   Has been on ortho-tri-cyclen and could take trade name only.  Non-smoker, does get aura migraines (developed int he last year or so), no clotting issues known.        The following portions of the patient's history were reviewed and updated as appropriate: allergies, current medications, past family history, past medical history, past social history, past surgical history and problem list.    Review of Systems   Constitutional: Negative for diaphoresis, fatigue and fever.   HENT: Negative.    Respiratory: Negative.    Cardiovascular: Negative.    Gastrointestinal: Negative.    Skin: Positive for color change (wart).   Neurological: Negative.    Psychiatric/Behavioral: Negative.    All other systems reviewed and are negative.      Objective    Vitals:    02/05/18 0945   BP: 100/60   Pulse: 65   Resp: 16   Temp: 98.2 °F (36.8 °C)   SpO2: 99%   Body mass index is 21.55 kg/(m^2).  No Known Allergies    BP Readings from Last 3 Encounters:   02/05/18 100/60   01/15/18 92/71   12/13/17 110/60       Physical Exam    Constitutional: She is oriented to person, place, and time. She appears well-developed and well-nourished.   Cardiovascular: Normal rate.    Pulmonary/Chest: Effort normal.   Musculoskeletal: Normal range of motion.   Neurological: She is alert and oriented to person, place, and time.   Skin: Skin is warm and dry. Lesion (wart on middle joint of left index finger. No drainage, no erythema. Tiny. ) noted.        Psychiatric: She has a normal mood and affect. Her behavior is normal. Judgment and thought content normal.   Nursing note and vitals reviewed.      Invalid input(s): 2W    Assessment/Plan   Problems Addressed this Visit     None      Visit Diagnoses     Oral contraception initiation    -  Primary    Relevant Medications    norethindrone (ORTHO MICRONOR) 0.35 MG tablet        Left index wart hit with aerosol cryo x 2. Lesion was well approximated and patient tolerated well.     Starting a progesterone only pill due to migraine with aura.  There are no Patient Instructions on file for this visit.

## 2018-02-20 ENCOUNTER — OFFICE VISIT (OUTPATIENT)
Dept: OBSTETRICS AND GYNECOLOGY | Facility: CLINIC | Age: 38
End: 2018-02-20

## 2018-02-20 VITALS
HEIGHT: 61 IN | BODY MASS INDEX: 20.96 KG/M2 | WEIGHT: 111 LBS | DIASTOLIC BLOOD PRESSURE: 60 MMHG | SYSTOLIC BLOOD PRESSURE: 100 MMHG

## 2018-02-20 DIAGNOSIS — H00.014 HORDEOLUM EXTERNUM OF LEFT UPPER EYELID: ICD-10-CM

## 2018-02-20 DIAGNOSIS — N92.1 MENORRHAGIA WITH IRREGULAR CYCLE: Primary | ICD-10-CM

## 2018-02-20 DIAGNOSIS — Z98.890 H/O NONCHEMICAL TUBAL OCCLUSION: ICD-10-CM

## 2018-02-20 DIAGNOSIS — N94.6 DYSMENORRHEA: ICD-10-CM

## 2018-02-20 PROCEDURE — 99214 OFFICE O/P EST MOD 30 MIN: CPT | Performed by: OBSTETRICS & GYNECOLOGY

## 2018-02-20 NOTE — PROGRESS NOTES
Patient Care Team:  Enrique Hankins MD as PCP - General (Family Medicine)    Patient new to practice? NO Patient new to examiner? NO     -----------------------------------------------------HISTORY---------------------------------------------------    Chief Complaint:   Chief Complaint   Patient presents with   • Follow-up     U/S from Dec2017     New problem to examiner? NO    Patient's last menstrual period was 02/10/2018.      HPI:       Pt here to discuss u/s results from December, and to discuss ovulation suppression methods.  Pt has migraines with aura, and can't take estrogen.  Her PCP placed her on micronor.  Her periods are heavy and she has dysmenorrhea.  Pt has had essure tubal occlusion.      HPI  U/s reviewed with pt today:    HOMOGENEOUS UTERINE ECHO PATTERN WITH ENDOMETRIAL THICKNESS OF .98 CM.  RIGHT OVARY IS WITHIN NORMAL LIMITS. -- A 1.8X1.4X1.6 CM. SONOLUCENT AREA IS SEEN  WITHIN THE LEFT OVARY. -- PROBABLE RESOLVING DOMINANT FOLLICLE. NO FREE FLUID  NOR ADNEXAL MASSES NOTED    Review of Systems   Constitutional: Negative.    HENT: Negative.    Eyes: Negative.    Respiratory: Negative.    Cardiovascular: Negative.    Gastrointestinal: Negative.    Endocrine: Negative.    Genitourinary: Positive for menstrual problem.   Musculoskeletal: Negative.    Skin: Negative.    Allergic/Immunologic: Negative.    Neurological: Negative.    Hematological: Negative.    Psychiatric/Behavioral: Negative.    :      PFSH: PAST HISTORY REVIEWED     1.  No past medical history on file.        Status of:      2.   Family History   Problem Relation Age of Onset   • Hypertension Mother    • Skin cancer Mother    • Hypertension Father    • Prostate cancer Father    • No Known Problems Sister        3. Social History: :  yes  Employment/occupation:  ?  Smoker:  no  Alcohol: no Recreational drugs: no     4.   Past Surgical History:   Procedure Laterality Date   • SHOULDER SURGERY Right 2015        5.   Current  "Outpatient Prescriptions:   •  diphenhydrAMINE (BENADRYL) 25 MG tablet, Take 25 mg by mouth Every 6 (Six) Hours As Needed for itching., Disp: , Rfl:   •  ibuprofen (ADVIL,MOTRIN) 200 MG tablet, Take 200 mg by mouth Every 6 (Six) Hours As Needed for mild pain (1-3)., Disp: , Rfl:   •  melatonin 5 MG tablet tablet, Take 5 mg by mouth Every Night., Disp: , Rfl:   •  norethindrone (ORTHO MICRONOR) 0.35 MG tablet, Take 1 tablet by mouth Daily., Disp: 28 tablet, Rfl: 12  •  Omega-3 Fatty Acids (OMEGA 3 PO), Take  by mouth., Disp: , Rfl:   •  valACYclovir (VALTREX) 1000 MG tablet, Take 1 tablet by mouth 2 (Two) Times a Day As Needed (cold sores)., Disp: 30 tablet, Rfl: 0    6. No Known Allergies                  -----------------------------------------------PHYSICAL EXAM----------------------------------------------    Vital Signs: /60  Ht 154.9 cm (61\")  Wt 50.3 kg (111 lb)  LMP 02/10/2018  Breastfeeding? No  BMI 20.97 kg/m2   Flowsheet Rows         First Filed Value    Admission Height  154.9 cm (61\") Documented at 02/20/2018 1325    Admission Weight  50.3 kg (111 lb) Documented at 02/20/2018 1325          Physical Exam   Constitutional: She is oriented to person, place, and time. She appears well-developed and well-nourished. No distress.   HENT:   Head: Normocephalic and atraumatic.   Mouth/Throat: No oropharyngeal exudate.   Eyes: EOM are normal.   Pt has sty in left eyelid.  No conjunctivitis.     Neck: Normal range of motion.   Cardiovascular: Normal rate.    Pulmonary/Chest: Effort normal.   Abdominal: Soft. Bowel sounds are normal.   Musculoskeletal: Normal range of motion.   Neurological: She is alert and oriented to person, place, and time.   Skin: Skin is warm and dry. No rash noted. She is not diaphoretic. No erythema. No pallor.   Psychiatric: She has a normal mood and affect. Her behavior is normal. Judgment and thought content normal.   Nursing note and vitals " reviewed.                          -----------------------------------------------MEDICAL DECISION MAKING-----------------------------  Risk counseling done:  no    Results Reviewed:     1.   Lab Results (last 24 hours)     ** No results found for the last 24 hours. **        2.   Imaging Results (last 24 hours)     ** No results found for the last 24 hours. **        3.   ECG/EMG Results (most recent)     None          Time: More than 50% of time spent in counseling and/or coordination of care:  Total face-to-face/floor time 35 min.  Time spent in counseling 30 min. Counseling included the following topics with prognosis, differential diagnosis, risks, benefits of treatment, instructions, complicance and/or risk reduction and alternatives: methods for non estrogen ovulation suppression methods including micronor, nexplanon, progestin IUDs, depo, etc.      Diagnoses:  Yamilex was seen today for follow-up.    Diagnoses and all orders for this visit:    Menorrhagia with irregular cycle    H/O nonchemical tubal occlusion    Dysmenorrhea    Hordeolum externum of left upper eyelid        PLAN: expectant management on OCs for ovulation suppression, not contraception.  Ophthalmology consult for stye.     Labs/imaging ordered: none    RTO No Follow-up on file.    Matthew Escalera MD  2:05 PM  02/20/18

## 2018-06-07 ENCOUNTER — OFFICE VISIT (OUTPATIENT)
Dept: FAMILY MEDICINE CLINIC | Facility: CLINIC | Age: 38
End: 2018-06-07

## 2018-06-07 VITALS
HEIGHT: 61 IN | WEIGHT: 108 LBS | HEART RATE: 57 BPM | DIASTOLIC BLOOD PRESSURE: 64 MMHG | BODY MASS INDEX: 20.39 KG/M2 | SYSTOLIC BLOOD PRESSURE: 100 MMHG | TEMPERATURE: 98.1 F | OXYGEN SATURATION: 100 %

## 2018-06-07 DIAGNOSIS — S86.891A SHIN SPLINTS, RIGHT, INITIAL ENCOUNTER: ICD-10-CM

## 2018-06-07 DIAGNOSIS — F51.01 PRIMARY INSOMNIA: Primary | ICD-10-CM

## 2018-06-07 DIAGNOSIS — B07.8 COMMON WART: ICD-10-CM

## 2018-06-07 PROBLEM — S86.899A SHIN SPLINTS: Status: ACTIVE | Noted: 2018-06-07

## 2018-06-07 PROBLEM — S39.012A LUMBAR STRAIN, INITIAL ENCOUNTER: Status: ACTIVE | Noted: 2018-06-07

## 2018-06-07 PROCEDURE — 99213 OFFICE O/P EST LOW 20 MIN: CPT | Performed by: FAMILY MEDICINE

## 2018-06-07 PROCEDURE — 17110 DESTRUCTION B9 LES UP TO 14: CPT | Performed by: FAMILY MEDICINE

## 2018-06-07 NOTE — PROGRESS NOTES
Subjective   Yamilex Dugan is a 38 y.o. female who is here for   Chief Complaint   Patient presents with   • Shin Splints     on and off x 1 month    • Back Pain     x 1 month    .     History of Present Illness   Works out 6 days a week  Added 3 mile runs 2 x a week  Has shin splint pain down right tibia at night.    Has midline lumbar pain some days      Wart on left index finger still there despite 2 cryos.    Would like to see sleep Md due to frequent awakenings at night  I suggested [facility].    The following portions of the patient's history were reviewed and updated as appropriate: allergies, current medications, past family history, past medical history, past social history, past surgical history and problem list.    Review of Systems    Objective     Physical Exam   Constitutional: She appears well-developed and well-nourished.   Cardiovascular: Normal rate.    Pulmonary/Chest: Effort normal.   Musculoskeletal:        Lumbar back: She exhibits tenderness and spasm.        Back:         Hands:       Legs:  Nursing note and vitals reviewed.    Cryotherapy, Skin Lesion  Date/Time: 6/7/2018 8:48 AM  Performed by: KINGSTON HERNANDEZ  Authorized by: KINGSTON HERNANDEZ   Consent: Verbal consent obtained.  Risks and benefits: risks, benefits and alternatives were discussed  Consent given by: patient  Patient understanding: patient states understanding of the procedure being performed  Patient tolerance: Patient tolerated the procedure well with no immediate complications  Comments: 3 rounds of freeze thaw to finger wart        Assessment/Plan   Yamilex was seen today for shin splints and back pain.    Diagnoses and all orders for this visit:    Primary insomnia  -     Ambulatory Referral to Sleep Medicine    Shin splints, right, initial encounter    Common wart  -     Cryotherapy, Skin Lesion    discussed shin splints,   Need to skip running for 2 weeks.    Lumbar pain in soft tissue overuse.      There are  no Patient Instructions on file for this visit.    Medications Discontinued During This Encounter   Medication Reason   • norethindrone (ORTHO MICRONOR) 0.35 MG tablet *Therapy completed        Return if symptoms worsen or fail to improve.    Dr. Enrique Hankins  [place], [place]

## 2018-08-01 ENCOUNTER — TELEPHONE (OUTPATIENT)
Dept: FAMILY MEDICINE CLINIC | Facility: CLINIC | Age: 38
End: 2018-08-01

## 2018-08-01 DIAGNOSIS — S76.119A STRAIN OF QUADRICEPS MUSCLE, UNSPECIFIED LATERALITY, INITIAL ENCOUNTER: Primary | ICD-10-CM

## 2018-08-01 NOTE — TELEPHONE ENCOUNTER
Strained quads x 3 weeks ago. Would like referral to go to PT.     Ok referral made    Enrique Hankins MD

## 2018-08-06 ENCOUNTER — TREATMENT (OUTPATIENT)
Dept: PHYSICAL THERAPY | Facility: CLINIC | Age: 38
End: 2018-08-06

## 2018-08-06 DIAGNOSIS — S76.111D STRAIN OF RIGHT QUADRICEPS, SUBSEQUENT ENCOUNTER: Primary | ICD-10-CM

## 2018-08-06 DIAGNOSIS — S76.112D STRAIN OF LEFT QUADRICEPS, SUBSEQUENT ENCOUNTER: ICD-10-CM

## 2018-08-06 PROCEDURE — 97140 MANUAL THERAPY 1/> REGIONS: CPT | Performed by: PHYSICAL THERAPIST

## 2018-08-06 PROCEDURE — 97161 PT EVAL LOW COMPLEX 20 MIN: CPT | Performed by: PHYSICAL THERAPIST

## 2018-08-06 PROCEDURE — 97110 THERAPEUTIC EXERCISES: CPT | Performed by: PHYSICAL THERAPIST

## 2018-08-06 NOTE — PATIENT INSTRUCTIONS
Patient was educated on findings of evaluation, purpose of treatment, and goals for therapy.  Treatment options discussed and questions answered.  Patient was educated on exercises/self treatment/pain relief techniques.  See Exercise Pro for complete list of patient's exercises.

## 2018-08-06 NOTE — PROGRESS NOTES
Physical Therapy Initial Evaluation and Plan of Care    TIME IN 3:30 TIME OUT 4:35    Patient: Yamilex Dugan   : 1980  Diagnosis/ICD-10 Code:  No primary diagnosis found.  Referring practitioner: Enrique Hankins MD  Date of Initial Visit: 2018  Today's Date: 2018  Patient seen for Visit count could not be calculated. Make sure you are using a visit which is associated with an episode. sessions           Subjective Questionnaire: LEFS: 83%      Subjective Evaluation    History of Present Illness  Date of onset: 2018  Mechanism of injury: No specific injury.  Was running, then squats.  Felt tightening, did biking the next day.  Felt like it cramped and locked up, finished class.  Still feels tight and guarded. Still doing workouts but it is hurting.    Subjective comment: No bruising.  Works out 6 days a week. 3 days of cardio classess (sprints after working out), weighst, cycling, one day of swimming (no pain).  No numbness or tingling. No giving way. No joint pain.  No significant LE injuries, no back injuries. Tues, Wed longer runs 3-4 miles have cut those out.  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 Pain  Current pain ratin  At worst pain rating: 3  Location: Left >right quads  Quality: tight (cramping)  Alleviating factors: massaging with cream, stretching foam roller.  Exacerbated by: low squats, running.    Social Support  Lives in: multiple-level home  Lives with: spouse and young children    Diagnostic Tests  No diagnostic tests performed    Treatments  Previous treatment: massage  Patient Goals  Patient goals for therapy: decreased pain, increased strength and return to sport/leisure activities             Objective       Observations     Additional Observation Details  No deformity.  Normal alignment patella.  No ecchymosis. No edema.    Palpation   Left   Hypertonic in the vastus lateralis.   Tenderness of the vastus lateralis.  "    Right   Hypertonic in the vastus lateralis. Tenderness of the vastus lateralis.     Additional Palpation Details  ITB left    Tenderness     Left Hip   Tenderness in the greater trochanter.     Additional Tenderness Details  No joint tenderness.    Active Range of Motion   Left Knee   Normal active range of motion    Right Knee   Normal active range of motion    Patellar Static Positioning   Left Knee: WFL  Right Knee: WFL    Strength/Myotome Testing     Left Hip   Planes of Motion   Flexion: 5  Extension: 5  Abduction: 4  Adduction: 5    Right Hip   Planes of Motion   Flexion: 5  Abduction: 4+  Adduction: 5    Left Knee   Flexion: 5    Right Knee   Flexion: 5    Additional Strength Details  Pain with abduction testing    Tests     Left Hip   Giovanni: Negative.   SLR: Negative.     Right Hip   Giovanni: Negative.    SLR: Negative.     Left Knee   Negative anterior Lachman, lateral Deana, medial Deana, patellar apprehension, valgus stress test at 30 degrees and varus stress test at 30 degrees.     Right Knee   Negative anterior Lachman, lateral Deana, medial Deana, patellar apprehension, valgus stress test at 30 degrees and varus stress test at 30 degrees.     Additional Tests Details  (+) bridge test    Ambulation     Observational Gait     Additional Observational Gait Details  Nonantalgic    Functional Assessment   Squat   Pain.     Single Leg Squat   Left Leg  Pain and valgus.     Right Leg  Pain.     Forward Step Down 6\"   Left Leg  Pain, ipsilateral trunk side bending and valgus. No preload.     Right Leg  Pain and valgus.     Single Leg Stance   Left: 20 seconds  Right: 20 seconds         Assessment & Plan     Assessment  Impairments: abnormal muscle tone, activity intolerance, impaired physical strength and pain with function  Assessment details: Patient is a 37 yo female who presents with bilateral grade I quad strain affecting mostly vastus lateralis.  She has full knee ROM but pain at the end " range especially on the left quad.  She has mild weakness in the quads and hip abductors. No palpable defect noted.  No joint or ligamentous involvement noted.  Patient is very active and works out 6 days a week.  Will need therapy to regain full strength, painfree ROM, and education on exercise progression.  Based on the above findings, patient is an excellent candidate for therapy.  Prognosis: good  Functional Limitations: lifting, uncomfortable because of pain and stooping  Goals  Plan Goals: STG X 2 weeks  1.  Full prone knee flexion with no significant pain.  2.  Start light strengthening without pain.  3.  Modify exercise program to tolerance without significant pain.  LTG X 6 weeks.  1.  No tenderness.  2.  Bilateral and single leg squat without pain or valgus.  3.  5/5 hip and knee strength.  4.  Rsume running and cardio without pain.    Plan  Therapy options: will be seen for skilled physical therapy services  Planned modality interventions: cryotherapy, ultrasound, electrical stimulation/Russian stimulation and iontophoresis  Planned therapy interventions: manual therapy, abdominal trunk stabilization, balance/weight-bearing training, neuromuscular re-education, therapeutic activities, strengthening, stretching and gait training  Frequency: 2x week  Duration in weeks: 6  Treatment plan discussed with: patient        Manual Therapy:    10     mins  88457;  Therapeutic Exercise:    8     mins  76094;     Neuromuscular Karla:         mins  41266;    Therapeutic Activity:           mins  14938;     Gait Training:            mins  72763;     Ultrasound:     8/10     mins  00658;    Electrical Stimulation:          mins  03595 ( );  Dry Needling           mins self-pay    Timed Treatment:   28   mins   Total Treatment:     65   mins    PT SIGNATURE: Verónica Gray, PT   DATE TREATMENT INITIATED: 8/6/2018    Initial Certification  Certification Period: 11/4/2018  I certify that the therapy services are  furnished while this patient is under my care.  The services outlined above are required by this patient, and will be reviewed every 90 days.     PHYSICIAN: Enrique Hankins MD      DATE:     Please sign and return via fax to [phone].. Thank you, [facility].

## 2018-08-10 ENCOUNTER — TREATMENT (OUTPATIENT)
Dept: PHYSICAL THERAPY | Facility: CLINIC | Age: 38
End: 2018-08-10

## 2018-08-10 DIAGNOSIS — S76.112D STRAIN OF LEFT QUADRICEPS, SUBSEQUENT ENCOUNTER: ICD-10-CM

## 2018-08-10 DIAGNOSIS — S76.111D STRAIN OF RIGHT QUADRICEPS, SUBSEQUENT ENCOUNTER: Primary | ICD-10-CM

## 2018-08-10 PROCEDURE — 97035 APP MDLTY 1+ULTRASOUND EA 15: CPT | Performed by: PHYSICAL THERAPIST

## 2018-08-10 PROCEDURE — 97110 THERAPEUTIC EXERCISES: CPT | Performed by: PHYSICAL THERAPIST

## 2018-08-10 PROCEDURE — 97112 NEUROMUSCULAR REEDUCATION: CPT | Performed by: PHYSICAL THERAPIST

## 2018-08-10 PROCEDURE — 97014 ELECTRIC STIMULATION THERAPY: CPT | Performed by: PHYSICAL THERAPIST

## 2018-08-10 NOTE — PROGRESS NOTES
Physical Therapy Daily Progress Note    Time In 3:00  Time Out      Visit # : 2  Yamilex Dugan reports: tightness in the quads.  Only did light work out.  Feeling quads with minimal activity.    Subjective     Objective   See Exercise, Manual, and Modality Logs for complete treatment.   Tenderness bilateral vastus lateralise  Ed:  Purpose of KT tape    Assessment/Plan  Relief with estim.  Added KT tape for muscle relaxaion.  Able to start strengthening without increase pain or tightness.  Progress strengthening /stabilization /functional activity  Add estim and KT tape to POC.  Assess above.  Give pictures of strength       Manual Therapy:    5     mins  05814;  Therapeutic Exercise:    25     mins  84933;     Neuromuscular Karla:    8    mins  83832;    Therapeutic Activity:           mins  94933;     Gait Training:            mins  44218;     Ultrasound:     6/8     mins  07620;    Electrical Stimulation:    15     mins  44989 ( );  Dry Needling           mins self-pay    Timed Treatment:   46   mins   Total Treatment:     62  mins    Verónica Gray, PT  Physical Therapist

## 2018-08-13 ENCOUNTER — TREATMENT (OUTPATIENT)
Dept: PHYSICAL THERAPY | Facility: CLINIC | Age: 38
End: 2018-08-13

## 2018-08-13 DIAGNOSIS — S76.112D STRAIN OF LEFT QUADRICEPS, SUBSEQUENT ENCOUNTER: ICD-10-CM

## 2018-08-13 DIAGNOSIS — S76.111D STRAIN OF RIGHT QUADRICEPS, SUBSEQUENT ENCOUNTER: Primary | ICD-10-CM

## 2018-08-13 PROCEDURE — 97035 APP MDLTY 1+ULTRASOUND EA 15: CPT | Performed by: PHYSICAL THERAPIST

## 2018-08-13 PROCEDURE — 97140 MANUAL THERAPY 1/> REGIONS: CPT | Performed by: PHYSICAL THERAPIST

## 2018-08-13 PROCEDURE — 97110 THERAPEUTIC EXERCISES: CPT | Performed by: PHYSICAL THERAPIST

## 2018-08-13 PROCEDURE — 97014 ELECTRIC STIMULATION THERAPY: CPT | Performed by: PHYSICAL THERAPIST

## 2018-08-13 NOTE — PROGRESS NOTES
Physical Therapy Daily Progress Note    Time In 3:20  Time Out      Visit # : 3  Yamilex Dugan reports: less tightness noted in quads.  Tape and estim helped last visit.  Able to work out this morning with squats and lunges.    Subjective     Objective   See Exercise, Manual, and Modality Logs for complete treatment.       Assessment/Plan  Challenged with exercises but no pain or spasms.   Progress per Plan of Care  Add resistance to plank with clam   Assess response to IASTM         Manual Therapy:    15     mins  97225;  Therapeutic Exercise:    15    mins  20017;     Neuromuscular Karla:    7     mins  76695;    Therapeutic Activity:           mins  69664;     Gait Training:            mins  66929;     Ultrasound:     6/8     mins  95506;    Electrical Stimulation:    15     mins  70490 ( );  Dry Needling           mins self-pay    Timed Treatment:   45   mins   Total Treatment:     60   mins    Verónica Gray, PT  Physical Therapist

## 2018-08-17 ENCOUNTER — TREATMENT (OUTPATIENT)
Dept: PHYSICAL THERAPY | Facility: CLINIC | Age: 38
End: 2018-08-17

## 2018-08-17 DIAGNOSIS — S76.112D STRAIN OF LEFT QUADRICEPS, SUBSEQUENT ENCOUNTER: ICD-10-CM

## 2018-08-17 DIAGNOSIS — S76.111D STRAIN OF RIGHT QUADRICEPS, SUBSEQUENT ENCOUNTER: Primary | ICD-10-CM

## 2018-08-17 PROCEDURE — 97140 MANUAL THERAPY 1/> REGIONS: CPT | Performed by: PHYSICAL THERAPIST

## 2018-08-17 PROCEDURE — 97110 THERAPEUTIC EXERCISES: CPT | Performed by: PHYSICAL THERAPIST

## 2018-08-17 PROCEDURE — 97014 ELECTRIC STIMULATION THERAPY: CPT | Performed by: PHYSICAL THERAPIST

## 2018-08-17 PROCEDURE — 97035 APP MDLTY 1+ULTRASOUND EA 15: CPT | Performed by: PHYSICAL THERAPIST

## 2018-08-17 NOTE — PROGRESS NOTES
Physical Therapy Daily Progress Note    Time In 3:00  Time Out 3:52    Visit # : 4  Yamilex Dugan reports: I biked twice this week.  Less pain/tightness.  Seems to be better since I've stopped running.    Subjective     Objective   See Exercise, Manual, and Modality Logs for complete treatment.   VL tenderness bilaterally      Assessment/Plan  Improved hip control with less valgus with new standing exercises. Tenderness persists but decreased guarding.  Progress per Plan of Care  Give pics if tolerated new exercises         Manual Therapy:    8     mins  99958;  Therapeutic Exercise:    20     mins  32765;     Neuromuscular Karla:         mins  01065;    Therapeutic Activity:           mins  62466;     Gait Training:            mins  30213;     Ultrasound:   8/10     mins  59853;    Electrical Stimulation:    15     mins  52670 ( );  Dry Needling           mins self-pay    Timed Treatment:   38   mins   Total Treatment:     52   mins    Verónica Gray, PT  Physical Therapist

## 2018-08-20 ENCOUNTER — TREATMENT (OUTPATIENT)
Dept: PHYSICAL THERAPY | Facility: CLINIC | Age: 38
End: 2018-08-20

## 2018-08-20 DIAGNOSIS — S76.111D STRAIN OF RIGHT QUADRICEPS, SUBSEQUENT ENCOUNTER: Primary | ICD-10-CM

## 2018-08-20 DIAGNOSIS — S76.112D STRAIN OF LEFT QUADRICEPS, SUBSEQUENT ENCOUNTER: ICD-10-CM

## 2018-08-20 PROCEDURE — 97140 MANUAL THERAPY 1/> REGIONS: CPT | Performed by: PHYSICAL THERAPIST

## 2018-08-20 PROCEDURE — 97014 ELECTRIC STIMULATION THERAPY: CPT | Performed by: PHYSICAL THERAPIST

## 2018-08-20 PROCEDURE — 97110 THERAPEUTIC EXERCISES: CPT | Performed by: PHYSICAL THERAPIST

## 2018-08-20 PROCEDURE — 97112 NEUROMUSCULAR REEDUCATION: CPT | Performed by: PHYSICAL THERAPIST

## 2018-08-20 NOTE — PROGRESS NOTES
Physical Therapy Daily Progress Note    Time In 2:55  Time Out 4:03    Visit # : 5  Yamilex Dugan reports: my quads felt good this weekend.  Improving with less tightness. I want to try running again this week.    Subjective     Objective   See Exercise, Manual, and Modality Logs for complete treatment.   Decreased tenderness bilateral vastus lateralis  Discussed run/walk program, level 1    Assessment/Plan  No pain with exercises, decreased tenderness, increased strength.  Advised patient to perform gradual return to running due to weakness and guarding.  Progress strengthening /stabilization /functional activity  Assess strength and symptoms after run/walk program  Assess valgus, start single leg squats or lunges         Manual Therapy:    8     mins  64244;  Therapeutic Exercise:    27     mins  27816;     Neuromuscular Karla:    8    mins  17911;    Therapeutic Activity:           mins  63494;     Gait Training:            mins  45584;     Ultrasound:     8/10     mins  54259;    Electrical Stimulation:    15     mins  36364 ( );  Dry Needling           mins self-pay    Timed Treatment:   53   mins   Total Treatment:     68   mins    Verónica Gray, PT  Physical Therapist

## 2018-08-24 ENCOUNTER — TREATMENT (OUTPATIENT)
Dept: PHYSICAL THERAPY | Facility: CLINIC | Age: 38
End: 2018-08-24

## 2018-08-24 DIAGNOSIS — S76.112D STRAIN OF LEFT QUADRICEPS, SUBSEQUENT ENCOUNTER: ICD-10-CM

## 2018-08-24 DIAGNOSIS — S76.111D STRAIN OF RIGHT QUADRICEPS, SUBSEQUENT ENCOUNTER: Primary | ICD-10-CM

## 2018-08-24 PROCEDURE — 97110 THERAPEUTIC EXERCISES: CPT | Performed by: PHYSICAL THERAPIST

## 2018-08-24 PROCEDURE — 97014 ELECTRIC STIMULATION THERAPY: CPT | Performed by: PHYSICAL THERAPIST

## 2018-08-24 PROCEDURE — 97112 NEUROMUSCULAR REEDUCATION: CPT | Performed by: PHYSICAL THERAPIST

## 2018-08-24 NOTE — PROGRESS NOTES
Physical Therapy Daily Progress Note    Time In 2:47  Time Out 4:00    Visit # : 6  Yamilex Dugan reports: ran 8 minutes, didn't hurt during run.  Had already done workout. Got tighter afterwards.  Feel like I am better overall but plateau'd    Subjective     Objective   See Exercise, Manual, and Modality Logs for complete treatment.   Palpable and visible decreased muscle mass medial right quad, no ecchymosis, no significant edema.    Assessment/Plan  Increased tightness and pain especially right after patient attempted run.  Noted more vastus medialis tenderness on right with decreased muscle mass.  Discussed with patient need to be stronger and painfree before running attempts. Still pain with quad resistance but overall improved strength.  Progress per Plan of Care           Manual Therapy:    8     mins  60898;  Therapeutic Exercise:    10     mins  34237;     Neuromuscular Karla:    8   mins  28340;    Therapeutic Activity:           mins  92398;     Gait Training:            mins  21781;     Ultrasound:     8/10     mins  30856;    Electrical Stimulation:    15     mins  78997 ( );  Dry Needling           mins self-pay    Timed Treatment:   36   mins   Total Treatment:     73   mins    Verónica Gray, PT  Physical Therapist

## 2018-08-27 ENCOUNTER — TREATMENT (OUTPATIENT)
Dept: PHYSICAL THERAPY | Facility: CLINIC | Age: 38
End: 2018-08-27

## 2018-08-27 DIAGNOSIS — S76.111D STRAIN OF RIGHT QUADRICEPS, SUBSEQUENT ENCOUNTER: Primary | ICD-10-CM

## 2018-08-27 DIAGNOSIS — S76.112D STRAIN OF LEFT QUADRICEPS, SUBSEQUENT ENCOUNTER: ICD-10-CM

## 2018-08-27 PROCEDURE — 97110 THERAPEUTIC EXERCISES: CPT | Performed by: PHYSICAL THERAPIST

## 2018-08-27 PROCEDURE — 97140 MANUAL THERAPY 1/> REGIONS: CPT | Performed by: PHYSICAL THERAPIST

## 2018-08-27 PROCEDURE — 97530 THERAPEUTIC ACTIVITIES: CPT | Performed by: PHYSICAL THERAPIST

## 2018-08-27 PROCEDURE — 97035 APP MDLTY 1+ULTRASOUND EA 15: CPT | Performed by: PHYSICAL THERAPIST

## 2018-08-27 PROCEDURE — 97014 ELECTRIC STIMULATION THERAPY: CPT | Performed by: PHYSICAL THERAPIST

## 2018-08-27 NOTE — PROGRESS NOTES
Physical Therapy Reassessment    Time In 3:00  Time Out 4:30    Visit # : 7  Yamilexchikis Dugan reports: quads a little better today.  I didn't do any workouts this weekend.  Did light workout today.  Feels tight.  Still plateau'd, not really improving now. I feel like I'm about 50% better but not improving now.    Subjective     Objective       Tests     Lumbar     Left   Negative passive SLR.     Right   Negative passive SLR.     Left Pelvic Girdle/Sacrum   Negative: sacrum compression and gapping.     Right Pelvic Girdle/Sacrum   Negative: sacrum compression and gapping.     Left Hip   Positive Brent.   Negative ALEXANDER, fulcrum, Gaenslen's, SI compression and SI distraction.   Giovanni: Positive.     Right Hip   Positive Brent.   Negative ALEXANDER, fulcrum, Gaenslen's, SI compression and SI distraction.   Giovanni: Positive.     Left Knee   Negative anterior drawer, lateral Deana, medial Deana, valgus stress test at 30 degrees and varus stress test at 30 degrees.     Right Knee   Negative anterior drawer, lateral Deana, medial Deana, valgus stress test at 30 degrees and varus stress test at 30 degrees.      See Exercise, Manual, and Modality Logs for complete treatment.     Right  Left   Hip flexion  5-/5* 5-/5  Extension 5/5 bilaterally with knee ext 4+/5   Abduction  Adduction   Dermatomes and Myotomes intact  Hamstring 4+/5 *   5/5  Quad      5-/5*   5-/5   *denotes pain    Assessment/Plan  No hip, lumbar, knee joint testing positive.  Strength hips and knee better.   and tight ITB, vastus lateralis. Weakness with functional squatting but less valgus noted.  Still appears as quad strain but slow improvement and continued guarding.  Discussed that continued working out may be prolonging healing progress.  Suggested visit to primary care physician.    Other patient to make appt with MD, decrease frequency of strengthening.  Continue bid stretching.  Add prone hip ext with knee flex          Manual Therapy:    15     mins  50701;  Therapeutic Exercise:    30     mins  61696;     Neuromuscular Karla:         mins  12943;    Therapeutic Activity:     15     mins  78418;     Gait Training:            mins  11176;     Ultrasound:     8/10     mins  40403;    Electrical Stimulation:    15     mins  08060 ( );  Dry Needling           mins self-pay    Timed Treatment:   68   mins   Total Treatment:     90   mins    Verónica Gray, PT [facility]    Physical Therapist

## 2018-08-28 ENCOUNTER — OFFICE VISIT (OUTPATIENT)
Dept: FAMILY MEDICINE CLINIC | Facility: CLINIC | Age: 38
End: 2018-08-28

## 2018-08-28 VITALS
OXYGEN SATURATION: 98 % | WEIGHT: 108.2 LBS | HEIGHT: 61 IN | BODY MASS INDEX: 20.43 KG/M2 | SYSTOLIC BLOOD PRESSURE: 98 MMHG | DIASTOLIC BLOOD PRESSURE: 60 MMHG | HEART RATE: 54 BPM

## 2018-08-28 DIAGNOSIS — B07.8 COMMON WART: ICD-10-CM

## 2018-08-28 DIAGNOSIS — M26.609 TMJ DYSFUNCTION: Primary | ICD-10-CM

## 2018-08-28 PROCEDURE — 99213 OFFICE O/P EST LOW 20 MIN: CPT | Performed by: FAMILY MEDICINE

## 2018-08-28 PROCEDURE — 17110 DESTRUCTION B9 LES UP TO 14: CPT | Performed by: FAMILY MEDICINE

## 2018-08-28 NOTE — PROGRESS NOTES
Subjective   Yamilex Dugan is a 38 y.o. female who is here for   Chief Complaint   Patient presents with   • Neck Pain   • Temporomandibular Joint Pain   • Verrucous Vulgaris     Left hand    .     History of Present Illness   Acute flair of her B TMJ  Saw dentist   recommended PT for her TMJ    Is currently in PT for her B quad muscle pain and IT issues    Also wart on finger i froze almost went away, wants to re freeze again    The following portions of the patient's history were reviewed and updated as appropriate: allergies, current medications, past medical history, past social history, past surgical history and problem list.    Review of Systems    Objective   Physical Exam   Constitutional: She appears well-developed and well-nourished.   HENT:   Head:       Neck: Muscular tenderness present.       Cardiovascular: Normal rate.    Skin:   Small commonwart on index finger  Cryo with 3 cycles  Tolerated well   Nursing note and vitals reviewed.      Assessment/Plan   Yamilex was seen today for neck pain, temporomandibular joint pain and verrucous vulgaris.    Diagnoses and all orders for this visit:    TMJ dysfunction  -     Ambulatory Referral to Physical Therapy Evaluate and treat    Common wart      There are no Patient Instructions on file for this visit.    There are no discontinued medications.     Return if symptoms worsen or fail to improve.    Dr. Enrique Hankins  [facility] [place], [place]

## 2018-08-31 ENCOUNTER — TREATMENT (OUTPATIENT)
Dept: PHYSICAL THERAPY | Facility: CLINIC | Age: 38
End: 2018-08-31

## 2018-08-31 DIAGNOSIS — S76.111D STRAIN OF RIGHT QUADRICEPS, SUBSEQUENT ENCOUNTER: Primary | ICD-10-CM

## 2018-08-31 PROCEDURE — 97530 THERAPEUTIC ACTIVITIES: CPT | Performed by: PHYSICAL THERAPIST

## 2018-08-31 PROCEDURE — 97140 MANUAL THERAPY 1/> REGIONS: CPT | Performed by: PHYSICAL THERAPIST

## 2018-08-31 PROCEDURE — 97110 THERAPEUTIC EXERCISES: CPT | Performed by: PHYSICAL THERAPIST

## 2018-09-05 ENCOUNTER — TREATMENT (OUTPATIENT)
Dept: PHYSICAL THERAPY | Facility: CLINIC | Age: 38
End: 2018-09-05

## 2018-09-05 DIAGNOSIS — S76.112D STRAIN OF LEFT QUADRICEPS, SUBSEQUENT ENCOUNTER: ICD-10-CM

## 2018-09-05 DIAGNOSIS — S76.111D STRAIN OF RIGHT QUADRICEPS, SUBSEQUENT ENCOUNTER: Primary | ICD-10-CM

## 2018-09-05 PROCEDURE — 97530 THERAPEUTIC ACTIVITIES: CPT | Performed by: PHYSICAL THERAPIST

## 2018-09-05 PROCEDURE — 97110 THERAPEUTIC EXERCISES: CPT | Performed by: PHYSICAL THERAPIST

## 2018-09-05 PROCEDURE — 97035 APP MDLTY 1+ULTRASOUND EA 15: CPT | Performed by: PHYSICAL THERAPIST

## 2018-09-05 PROCEDURE — 97014 ELECTRIC STIMULATION THERAPY: CPT | Performed by: PHYSICAL THERAPIST

## 2018-09-05 PROCEDURE — 97140 MANUAL THERAPY 1/> REGIONS: CPT | Performed by: PHYSICAL THERAPIST

## 2018-09-05 NOTE — PROGRESS NOTES
Physical Therapy Daily Progress Note         Visit # : 9  Yamilex Dugan reports: improving, tight, no spasms.    Subjective     Objective   See Exercise, Manual, and Modality Logs for complete treatment.       Assessment/Plan  Progressing well with strength.  Still challenged and mild right VL pain with single leg squat.  More stability noted.  Progress strengthening /stabilization /functional activity           Manual Therapy:    10     mins  38517;  Therapeutic Exercise:    20     mins  76733;     Neuromuscular Karla:         mins  56475;    Therapeutic Activity:     15     mins  44961;     Gait Training:            mins  91310;     Ultrasound:     10     mins  72999;    Electrical Stimulation:    15     mins  27339 ( );  Dry Needling           mins self-pay    Timed Treatment:   55   mins   Total Treatment:     70   mins    Verónica Gray, PT  Physical Therapist

## 2018-09-07 ENCOUNTER — TREATMENT (OUTPATIENT)
Dept: PHYSICAL THERAPY | Facility: CLINIC | Age: 38
End: 2018-09-07

## 2018-09-07 DIAGNOSIS — R29.898 POPPING OF TEMPOROMANDIBULAR JOINT ON OPENING OF JAW: ICD-10-CM

## 2018-09-07 DIAGNOSIS — R19.8 CLENCHING OF TEETH: ICD-10-CM

## 2018-09-07 DIAGNOSIS — M26.609 TMJ DYSFUNCTION: Primary | ICD-10-CM

## 2018-09-07 DIAGNOSIS — M54.2 CERVICAL PAIN: ICD-10-CM

## 2018-09-07 DIAGNOSIS — R63.8 DIFFICULTY EATING: ICD-10-CM

## 2018-09-07 PROCEDURE — G8993 SUB PT/OT CURRENT STATUS: HCPCS | Performed by: PHYSICAL THERAPIST

## 2018-09-07 PROCEDURE — 97530 THERAPEUTIC ACTIVITIES: CPT | Performed by: PHYSICAL THERAPIST

## 2018-09-07 PROCEDURE — 97140 MANUAL THERAPY 1/> REGIONS: CPT | Performed by: PHYSICAL THERAPIST

## 2018-09-07 PROCEDURE — 97162 PT EVAL MOD COMPLEX 30 MIN: CPT | Performed by: PHYSICAL THERAPIST

## 2018-09-07 PROCEDURE — G8994 SUB PT/OT GOAL STATUS: HCPCS | Performed by: PHYSICAL THERAPIST

## 2018-09-07 PROCEDURE — 97014 ELECTRIC STIMULATION THERAPY: CPT | Performed by: PHYSICAL THERAPIST

## 2018-09-07 NOTE — PATIENT INSTRUCTIONS
Patient was educated on findings of evaluation, purpose of treatment, and goals for therapy.  Treatment options discussed and questions answered.  Patient was educated on exercises/self treatment/pain relief techniques.  Patient educated on sleeping positions, soft foods, relaxation, and jaw relaxation techniques.

## 2018-09-07 NOTE — PROGRESS NOTES
Physical Therapy Initial Evaluation and Plan of Care         Patient: Yamilex Dugan   : 1980  Diagnosis/ICD-10 Code:  TMJ dysfunction [M26.609]  Referring practitioner: Enrique Hankins MD; Dr. Linda Villa  Date of Initial Visit: 2018  Today's Date: 2018  Patient seen for 1 sessions           Subjective Questionnaire: NDI:22%, TMJ Disability Questionnaire 25%      Subjective Evaluation    History of Present Illness  Date of onset: 2018  Mechanism of injury:  first incidence with TMJ, big yawn, heard jaw pop.  Started clicking.  Injected but has always clicked.  No other treatment.  Intermittent pain, stress increased.   Approximately 2 weeks ago, was eating at dinner, right jaw locked, very painful.  Able to unlock and continue eating.  Went to dentist.    Subjective comment: Clicks with opening and closing.  Has migraines x 1year 90 minutes, treated with Exedrin. No numbness and tingling. (+) Clenching jaw. no h/o cervical spine issues, no cervical or jaw trauma.  Patient Occupation: [employer]   Precautions and Work Restrictions: NonePain  Current pain ratin (constant low level)  Location: right TMJ, right upper trap, frontal lobe HA, auditory, photophobia  Quality: dull ache  Alleviating factors: nightspints in past, injection.  Exacerbated by: opening, closing, opening wide with talking, won't click if mouth closed,   Progression: worsening    Diagnostic Tests  Abnormal x-ray: 2008 tmj.    Treatments  Previous treatment: injection treatment  Patient Goals  Patient goals for therapy: decreased pain and increased motion  Patient goal: Eat without pain.           Objective     Special Questions      Additional Special Questions  Denies night pain, dizziness, tinnitus, fullness in ears, or blurred vision.      Postural Observations    Additional Postural Observation Details  Mild forward head    Palpation     Right Tenderness of the sternocleidomastoid, suboccipitals and upper  "trapezius.     Additional Palpation Details  [place], pterygoid    Tenderness   Cervical Spine   Tenderness in the spinous process.     Additional Tenderness Details  Right TMJ, C2    Active Range of Motion   Cervical/Thoracic Spine   Cervical    Flexion: 60 degrees   Extension: 60 degrees with pain  Left lateral flexion: 30 degrees with pain  Right lateral flexion: 42 degrees   Left rotation: 60 degrees with pain  Right rotation: 66 degrees     Passive Range of Motion     Additional Passive Range of Motion Details  AA ROM limited left rotation    Tests   Cervical     Left   Negative alar ligament integrity, cervical distraction and Spurling's sign.     Right   Negative alar ligament integrity, cervical distraction and Spurling's sign.     Left Shoulder   Negative active compression ([place]).     Right Shoulder   Negative active compression (Kim[place]).     Additional Tests Details  (-) vertebral artery         Assessment & Plan     Assessment  Impairments: abnormal muscle firing, abnormal muscle tone, abnormal or restricted ROM, impaired physical strength and pain with function  Assessment details: Patient is a 37 yo female that presents with acute onset of TMJ locking in a closed position.  She has a h/o pain and clicking in the right jaw.  She is noted to have decreased cervical ROM, muscular tenderness, headaches, and weakness.  She also has limited painful opening, lateral excursion, and protrusion.  She has opening subluxation and relocation clicking of the articular tmj disc with \"c\" pattern.  Functionally this is affecting her ability to sleep, talk, eat and open her mouth completely. Based on the above findings she is a good candidate for therapy.  Functional Limitations: unable to perform repetitive tasks  Goals  Plan Goals: STG X6 weeks  1.  Decrease pain to intermittent  2.  Increase opening 4 mm.  3.  Increase left lateral excursion 2 mm.  4.  Tolerate initial exercises.  LTG X 12 weeks  1.  Open and " close mouth without significant pain.  2.  Full cervical ROM  3.  Resume normal eating and talking with tolerable symptoms.  4.  Improved NDI 10% and TMJ disability 10%.    Plan  Therapy options: will be seen for skilled physical therapy services  Planned modality interventions: cryotherapy, electrical stimulation/[place] stimulation, ultrasound and thermotherapy (hydrocollator packs)  Planned therapy interventions: manual therapy, motor coordination training, neuromuscular re-education, fine motor coordination training, functional ROM exercises, home exercise program, therapeutic activities, stretching, strengthening and soft tissue mobilization  Frequency: 2x week  Duration in visits: 15  Treatment plan discussed with: patient        Manual Therapy:   10       mins  61279;  Therapeutic Exercise:         mins  28644;     Neuromuscular Karla:         mins  40457;    Therapeutic Activity:     8     mins  38764;     Gait Training:            mins  80903;     Ultrasound:     6/8     mins  92211;    Electrical Stimulation:    15     mins  75191 ( );  Dry Needling           mins self-pay    Timed Treatment:   26   mins   Total Treatment:     70   mins    PT SIGNATURE: Verónica Gray, PT   DATE TREATMENT INITIATED: 9/7/2018    Initial Certification  Certification Period: 12/6/2018  I certify that the therapy services are furnished while this patient is under my care.  The services outlined above are required by this patient, and will be reviewed every 90 days.     PHYSICIAN: Enrique Hankins MD      DATE:     Please sign and return via fax to [phone]. Thank you, [facility].

## 2018-09-10 ENCOUNTER — TREATMENT (OUTPATIENT)
Dept: PHYSICAL THERAPY | Facility: CLINIC | Age: 38
End: 2018-09-10

## 2018-09-10 DIAGNOSIS — S76.111D STRAIN OF RIGHT QUADRICEPS, SUBSEQUENT ENCOUNTER: Primary | ICD-10-CM

## 2018-09-10 DIAGNOSIS — S76.112D STRAIN OF LEFT QUADRICEPS, SUBSEQUENT ENCOUNTER: ICD-10-CM

## 2018-09-10 PROCEDURE — 97530 THERAPEUTIC ACTIVITIES: CPT | Performed by: PHYSICAL THERAPIST

## 2018-09-10 PROCEDURE — 97035 APP MDLTY 1+ULTRASOUND EA 15: CPT | Performed by: PHYSICAL THERAPIST

## 2018-09-10 PROCEDURE — 97014 ELECTRIC STIMULATION THERAPY: CPT | Performed by: PHYSICAL THERAPIST

## 2018-09-10 NOTE — PROGRESS NOTES
Physical Therapy Reassessment         Visit # : 10  Yamilex Dugan reports: I went to a class this morning.  More intense strengthening, squats. We did tuck jumps and I immediately felt my quads tighten up.  Could barely walk.    Subjective     Objective       Observations     Additional Observation Details  Guarding bilateral quads.  Left quad with several areas of redness that patient reports was from icing without cover.    Palpation   Left   Hypertonic in the vastus lateralis.   Tenderness of the vastus lateralis.     Right   Hypertonic in the vastus lateralis. Tenderness of the vastus lateralis.     Active Range of Motion   Left Knee   Flexion: 134 degrees     Right Knee   Flexion: 121 degrees     Additional Active Range of Motion Details  Guarding and pain with attempts at flexion    Strength/Myotome Testing     Left Knee   Extension: 3+    Right Knee   Extension: 3+    Swelling     Left Knee Girth Measurement (cm)   10 cm above joint line: 40.8.    Right Knee Girth Measurement (cm)   10 cm above joint line: 40.1.     See Exercise, Manual, and Modality Logs for complete treatment.       Assessment/Plan  Patient with exacerbation of bilateral vastus lateralis strain.  Increased tone, tenderness, weakness and limited ROM.  No palpable defects noted in quads, no ecchymosis.    Other:  Ice, gentle ROM, no gym or other LE exercises           Manual Therapy:          mins  60612;  Therapeutic Exercise:    5      mins  58386;     Neuromuscular Karla:         mins  80869;    Therapeutic Activity:     13     mins  62562;     Gait Training:            mins  41256;     Ultrasound:     12     mins  39988;    Electrical Stimulation:    15     mins  70592 ( );  Dry Needling           mins self-pay    Timed Treatment:   30   mins   Total Treatment:     45   mins    Verónica Gray, PT  Physical Therapist

## 2018-09-11 ENCOUNTER — TELEPHONE (OUTPATIENT)
Dept: FAMILY MEDICINE CLINIC | Facility: CLINIC | Age: 38
End: 2018-09-11

## 2018-09-11 NOTE — TELEPHONE ENCOUNTER
Pt called and said that PT had been going well for her, she felt better. After a recent workout she has felt worse than she did before. She is now taking 1600 mg a day in three doses of Motrin a day. She is just wondering what else she can do?

## 2018-09-12 ENCOUNTER — OFFICE VISIT (OUTPATIENT)
Dept: FAMILY MEDICINE CLINIC | Facility: CLINIC | Age: 38
End: 2018-09-12

## 2018-09-12 ENCOUNTER — TREATMENT (OUTPATIENT)
Dept: PHYSICAL THERAPY | Facility: CLINIC | Age: 38
End: 2018-09-12

## 2018-09-12 VITALS
BODY MASS INDEX: 20.77 KG/M2 | WEIGHT: 110 LBS | DIASTOLIC BLOOD PRESSURE: 64 MMHG | HEART RATE: 69 BPM | SYSTOLIC BLOOD PRESSURE: 100 MMHG | HEIGHT: 61 IN | OXYGEN SATURATION: 99 %

## 2018-09-12 DIAGNOSIS — R29.898 POPPING OF TEMPOROMANDIBULAR JOINT ON OPENING OF JAW: ICD-10-CM

## 2018-09-12 DIAGNOSIS — M26.609 TMJ DYSFUNCTION: ICD-10-CM

## 2018-09-12 DIAGNOSIS — M54.2 CERVICAL PAIN: ICD-10-CM

## 2018-09-12 DIAGNOSIS — S76.112D STRAIN OF LEFT QUADRICEPS, SUBSEQUENT ENCOUNTER: ICD-10-CM

## 2018-09-12 DIAGNOSIS — R63.8 DIFFICULTY EATING: ICD-10-CM

## 2018-09-12 DIAGNOSIS — S76.111D STRAIN OF RIGHT QUADRICEPS, SUBSEQUENT ENCOUNTER: Primary | ICD-10-CM

## 2018-09-12 DIAGNOSIS — R25.2 MUSCLE CRAMPING: ICD-10-CM

## 2018-09-12 DIAGNOSIS — M62.89 QUADRICEP TIGHTNESS: Primary | ICD-10-CM

## 2018-09-12 DIAGNOSIS — R19.8 CLENCHING OF TEETH: ICD-10-CM

## 2018-09-12 PROCEDURE — 97110 THERAPEUTIC EXERCISES: CPT | Performed by: PHYSICAL THERAPIST

## 2018-09-12 PROCEDURE — 97014 ELECTRIC STIMULATION THERAPY: CPT | Performed by: PHYSICAL THERAPIST

## 2018-09-12 PROCEDURE — 97140 MANUAL THERAPY 1/> REGIONS: CPT | Performed by: PHYSICAL THERAPIST

## 2018-09-12 PROCEDURE — 99213 OFFICE O/P EST LOW 20 MIN: CPT | Performed by: FAMILY MEDICINE

## 2018-09-12 PROCEDURE — 97035 APP MDLTY 1+ULTRASOUND EA 15: CPT | Performed by: PHYSICAL THERAPIST

## 2018-09-12 RX ORDER — PREDNISONE 10 MG/1
10 TABLET ORAL 3 TIMES DAILY
Qty: 21 TABLET | Refills: 0 | Status: SHIPPED | OUTPATIENT
Start: 2018-09-12

## 2018-09-12 NOTE — PROGRESS NOTES
Subjective   Yamilex Dugan is a 38 y.o. female who is here for   Chief Complaint   Patient presents with   • Leg Pain     x 3 months   • Follow-up     Pt called and said that PT had been going well for her, she felt better. After a recent workout she has felt worse than she did before. She is now taking 1600 mg a day in three doses of Motrin a day. She is just wondering what else she can do?   .     History of Present Illness   Still with B quad muscle pain, weakness  Exercises most days and for most of her life  A little strange that she is having such issues.  Enrolled in PT, reviewed the notes.        The following portions of the patient's history were reviewed and updated as appropriate: allergies, current medications, past family history, past medical history, past social history, past surgical history and problem list.    Review of Systems    Objective   Physical Exam   Constitutional: She appears well-developed and well-nourished.   Cardiovascular: Intact distal pulses.    Musculoskeletal:        Left upper leg: She exhibits tenderness. She exhibits no bony tenderness, no swelling and no edema.        Legs:  Skin: No bruising, no ecchymosis, no petechiae and no rash noted.   Nursing note and vitals reviewed.      Assessment/Plan   Yamilex was seen today for leg pain and follow-up.    Diagnoses and all orders for this visit:    Quadricep tightness  -     predniSONE (DELTASONE) 10 MG tablet; Take 1 tablet by mouth 3 (Three) Times a Day.  -     SONA With / DsDNA, RNP, Sjogrens A / B, Smith  -     CK  -     TSH  -     C-reactive Protein  -     Sedimentation Rate  -     Renal Function Panel  -     Magnesium    Muscle cramping  -     predniSONE (DELTASONE) 10 MG tablet; Take 1 tablet by mouth 3 (Three) Times a Day.  -     SONA With / DsDNA, RNP, Sjogrens A / B, Smith  -     CK  -     TSH  -     C-reactive Protein  -     Sedimentation Rate  -     Renal Function Panel  -     Magnesium      There are no Patient  Instructions on file for this visit.    There are no discontinued medications.     Return if symptoms worsen or fail to improve.    Dr. Enrique Hankins  [facility] [place], [place]

## 2018-09-12 NOTE — PROGRESS NOTES
Physical Therapy Daily Progress Note         Visit # : 11  Yamilex Dugan reports: jaw is feeling better. Quads felt better after last session.  Still really tight and guarding.  I can't even go up and down stairs without pain.  Saw MD today and he started me on prednisone, did blood work.    Subjective     Objective   See Exercise, Manual, and Modality Logs for complete treatment.   Right TMJ opening and closing click    Assessment/Plan  No click with opening exercise with tongue on palate.  Improved cervical PROM but still guarding at SCM  Progress strengthening /stabilization /functional activity  Continue rest and limited LE activity.  Assess new cervical exercises.         Manual Therapy:    15     mins  90103;  Therapeutic Exercise:    10     mins  04284;     Neuromuscular Karla:         mins  63838;    Therapeutic Activity:           mins  06160;     Gait Training:            mins  10797;     Ultrasound:     20     mins  51900;    Electrical Stimulation:    15     mins  82037 ( );  Dry Needling           mins self-pay    Timed Treatment:   45   mins   Total Treatment:     60   mins    Verónica Gray, PT  Physical Therapist

## 2018-09-13 LAB
ALBUMIN SERPL-MCNC: 4.3 G/DL (ref 3.5–5.2)
ANA SER QL: NEGATIVE
BUN SERPL-MCNC: 12 MG/DL (ref 6–20)
BUN/CREAT SERPL: 14.3 (ref 7–25)
CALCIUM SERPL-MCNC: 9.2 MG/DL (ref 8.6–10.5)
CHLORIDE SERPL-SCNC: 105 MMOL/L (ref 98–107)
CK SERPL-CCNC: 204 U/L (ref 26–142)
CO2 SERPL-SCNC: 29 MMOL/L (ref 22–29)
CREAT SERPL-MCNC: 0.84 MG/DL (ref 0.57–1)
CRP SERPL-MCNC: <0.03 MG/DL (ref 0–0.5)
ERYTHROCYTE [SEDIMENTATION RATE] IN BLOOD BY WESTERGREN METHOD: 5 MM/HR (ref 0–20)
GLUCOSE SERPL-MCNC: 68 MG/DL (ref 65–99)
MAGNESIUM SERPL-MCNC: 2.2 MG/DL (ref 1.7–2.5)
PHOSPHATE SERPL-MCNC: 3.6 MG/DL (ref 2.7–4.5)
POTASSIUM SERPL-SCNC: 5.1 MMOL/L (ref 3.5–5.2)
SODIUM SERPL-SCNC: 142 MMOL/L (ref 136–145)
TSH SERPL DL<=0.005 MIU/L-ACNC: 1.3 MIU/ML (ref 0.27–4.2)

## 2018-09-14 ENCOUNTER — TREATMENT (OUTPATIENT)
Dept: PHYSICAL THERAPY | Facility: CLINIC | Age: 38
End: 2018-09-14

## 2018-09-14 DIAGNOSIS — M26.609 TMJ DYSFUNCTION: ICD-10-CM

## 2018-09-14 DIAGNOSIS — M54.2 CERVICAL PAIN: ICD-10-CM

## 2018-09-14 DIAGNOSIS — R19.8 CLENCHING OF TEETH: ICD-10-CM

## 2018-09-14 DIAGNOSIS — R29.898 POPPING OF TEMPOROMANDIBULAR JOINT ON OPENING OF JAW: ICD-10-CM

## 2018-09-14 DIAGNOSIS — S76.112D STRAIN OF LEFT QUADRICEPS, SUBSEQUENT ENCOUNTER: ICD-10-CM

## 2018-09-14 DIAGNOSIS — R63.8 DIFFICULTY EATING: ICD-10-CM

## 2018-09-14 DIAGNOSIS — S76.111D STRAIN OF RIGHT QUADRICEPS, SUBSEQUENT ENCOUNTER: Primary | ICD-10-CM

## 2018-09-14 PROCEDURE — 97140 MANUAL THERAPY 1/> REGIONS: CPT | Performed by: PHYSICAL THERAPIST

## 2018-09-14 PROCEDURE — 97035 APP MDLTY 1+ULTRASOUND EA 15: CPT | Performed by: PHYSICAL THERAPIST

## 2018-09-14 PROCEDURE — 97110 THERAPEUTIC EXERCISES: CPT | Performed by: PHYSICAL THERAPIST

## 2018-09-14 NOTE — PROGRESS NOTES
Physical Therapy Daily Progress Note         Visit # : 12  Yamilex Dugan reports: results of labs back, high creatine but MD reports normal for working out.  No other abnormal.    Quads feel better today and I was finally able to go up the stairs.  Jaw feels much better.    Subjective     Objective       Strength/Myotome Testing     Left Shoulder     Isolated Muscles   Latissimus: 5   Lower trapezius: 4   Middle trapezius: 5     Right Shoulder     Isolated Muscles   Latissimus: 5   Lower trapezius: 3+   Middle trapezius: 3+      See Exercise, Manual, and Modality Logs for complete treatment.       Assessment/Plan  Added KT tape for inhibition. Full knee ROM but still painful at end range. No popping at TMJ with relocation exercise or opening exercise. Decreased SCM tone. Weakness in right scapular muscles may be adding to cervical dysfunction as cervical muscles have to compensate for weakness.  Progress strengthening /stabilization /functional activity  Add hip 3 way if tolerated, hamstring curls.         Manual Therapy:    20     mins  16481;  Therapeutic Exercise:    20     mins  99253;     Neuromuscular Karla:         mins  95417;    Therapeutic Activity:           mins  47457;     Gait Training:            mins  10375;     Ultrasound:      20     mins  50491;    Electrical Stimulation:    15     mins  71815 ( );  Dry Needling           mins self-pay    Timed Treatment:   60    mins   Total Treatment:     90   mins    Verónica Gray, PT  Physical Therapist

## 2018-09-17 ENCOUNTER — TREATMENT (OUTPATIENT)
Dept: PHYSICAL THERAPY | Facility: CLINIC | Age: 38
End: 2018-09-17

## 2018-09-17 DIAGNOSIS — S76.112D STRAIN OF LEFT QUADRICEPS, SUBSEQUENT ENCOUNTER: ICD-10-CM

## 2018-09-17 DIAGNOSIS — R19.8 CLENCHING OF TEETH: ICD-10-CM

## 2018-09-17 DIAGNOSIS — M54.2 CERVICAL PAIN: ICD-10-CM

## 2018-09-17 DIAGNOSIS — R29.898 POPPING OF TEMPOROMANDIBULAR JOINT ON OPENING OF JAW: ICD-10-CM

## 2018-09-17 DIAGNOSIS — M26.609 TMJ DYSFUNCTION: ICD-10-CM

## 2018-09-17 DIAGNOSIS — R63.8 DIFFICULTY EATING: ICD-10-CM

## 2018-09-17 DIAGNOSIS — S76.111D STRAIN OF RIGHT QUADRICEPS, SUBSEQUENT ENCOUNTER: Primary | ICD-10-CM

## 2018-09-17 PROCEDURE — 97140 MANUAL THERAPY 1/> REGIONS: CPT | Performed by: PHYSICAL THERAPIST

## 2018-09-17 PROCEDURE — 97530 THERAPEUTIC ACTIVITIES: CPT | Performed by: PHYSICAL THERAPIST

## 2018-09-17 PROCEDURE — 97110 THERAPEUTIC EXERCISES: CPT | Performed by: PHYSICAL THERAPIST

## 2018-09-17 PROCEDURE — 97014 ELECTRIC STIMULATION THERAPY: CPT | Performed by: PHYSICAL THERAPIST

## 2018-09-17 NOTE — PROGRESS NOTES
Physical Therapy Daily Progress Note    Visit # : 4  Yamilexchikis Dugan reports: jaw is much better.  No locking episodes, a lot less pain.  Saw dentist today, wants to continue PT, no mouthguard at this time. Quads are better today.  Worked out upper body.  Going to try swimming freestyle or breaststroke.    Subjective     Objective   See Exercise, Manual, and Modality Logs for complete treatment.   Decreased guarding but still palpable hypertonicity distal vastus lateralis.  Tender right masseter, scm    Assessment/Plan  Improved symptoms but quads still very guarded.  Able to initiate light hip and hamstring strength without reactive quad spasm.  Increased TMJ opening with less pain.  Progress per Plan of Care  Progress light quad ex if tolerated return to hip and hamstring today.  Discussed dry needling for quads for future.         Manual Therapy:    10    mins  97623;  Therapeutic Exercise:    10     mins  81372;     Neuromuscular Karla:         mins  21796;    Therapeutic Activity:     10     mins  41722;     Gait Training:            mins  93626;     Ultrasound:     16     mins  24723;    Electrical Stimulation:    15     mins  93496 ( );  Dry Needling           mins self-pay    Timed Treatment:   46   mins   Total Treatment:     75   mins    Verónica Gray, PT  Physical Therapist

## 2018-09-21 ENCOUNTER — TREATMENT (OUTPATIENT)
Dept: PHYSICAL THERAPY | Facility: CLINIC | Age: 38
End: 2018-09-21

## 2018-09-21 DIAGNOSIS — M26.609 TMJ DYSFUNCTION: ICD-10-CM

## 2018-09-21 PROCEDURE — 97530 THERAPEUTIC ACTIVITIES: CPT | Performed by: PHYSICAL THERAPIST

## 2018-09-21 PROCEDURE — 97110 THERAPEUTIC EXERCISES: CPT | Performed by: PHYSICAL THERAPIST

## 2018-09-21 PROCEDURE — 97140 MANUAL THERAPY 1/> REGIONS: CPT | Performed by: PHYSICAL THERAPIST

## 2018-09-21 PROCEDURE — 97014 ELECTRIC STIMULATION THERAPY: CPT | Performed by: PHYSICAL THERAPIST

## 2018-09-21 NOTE — PROGRESS NOTES
Physical Therapy Daily Progress Note         Visit # : 4  Yamilex Dugan reports: jaw is feeling much better.  I swam and did upper body.  Quads did okay.  A little better.  I feel like I am back now to where I was when I first started seeing you before I restrained the muscles.    Subjective     Objective   See Exercise, Manual, and Modality Logs for complete treatment.       Assessment/Plan  Full flexion bilateral knees with mild quad discomfort.  Able to tolerate light quad strengthening and hips without increased pain.  Progress strengthening /stabilization /functional activity  Cervical isometrics         Manual Therapy:    10     mins  15422;  Therapeutic Exercise:    15     mins  73211;     Neuromuscular Karla:         mins  74935;    Therapeutic Activity:     10     mins  92263;     Gait Training:            mins  71682;     Ultrasound:     8     mins  30198;    Electrical Stimulation:    15     mins  81495 ( );  Dry Needling           mins self-pay    Timed Treatment:   43   mins   Total Treatment:     75   mins    Verónica Gray, PT  Physical Therapist

## 2018-09-26 ENCOUNTER — TREATMENT (OUTPATIENT)
Dept: PHYSICAL THERAPY | Facility: CLINIC | Age: 38
End: 2018-09-26

## 2018-09-26 DIAGNOSIS — R63.8 DIFFICULTY EATING: ICD-10-CM

## 2018-09-26 DIAGNOSIS — M26.609 TMJ DYSFUNCTION: Primary | ICD-10-CM

## 2018-09-26 DIAGNOSIS — M54.2 CERVICAL PAIN: ICD-10-CM

## 2018-09-26 DIAGNOSIS — S76.111D STRAIN OF RIGHT QUADRICEPS, SUBSEQUENT ENCOUNTER: ICD-10-CM

## 2018-09-26 DIAGNOSIS — S76.112D STRAIN OF LEFT QUADRICEPS, SUBSEQUENT ENCOUNTER: ICD-10-CM

## 2018-09-26 DIAGNOSIS — R19.8 CLENCHING OF TEETH: ICD-10-CM

## 2018-09-26 DIAGNOSIS — R29.898 POPPING OF TEMPOROMANDIBULAR JOINT ON OPENING OF JAW: ICD-10-CM

## 2018-09-26 PROCEDURE — 97140 MANUAL THERAPY 1/> REGIONS: CPT | Performed by: PHYSICAL THERAPIST

## 2018-09-26 PROCEDURE — 97110 THERAPEUTIC EXERCISES: CPT | Performed by: PHYSICAL THERAPIST

## 2018-09-26 PROCEDURE — 97014 ELECTRIC STIMULATION THERAPY: CPT | Performed by: PHYSICAL THERAPIST

## 2018-09-26 NOTE — PROGRESS NOTES
Physical Therapy Daily Progress Note       Time Out 3:58    Visit # : 14  Yamilex Dugan reports: my neck and jaw feel really good.  I can turn my neck without issues.  I have been swimming without any problem. Did a little more in the gym, less tightness in the quads.    Subjective     Objective   See Exercise, Manual, and Modality Logs for complete treatment.   cervcial  % except left sidebending 75%  Decreased tone in right upper trap, bilateral VL    Assessment/Plan  Gradually able to progress strengthening with no increase in pain or tightness.  Full knee flexion but still end range discomfort.  Cervical ROM much improved.  Progress strengthening /stabilization /functional activity           Manual Therapy:    8     mins  20067;  Therapeutic Exercise:    25     mins  58081;     Neuromuscular Karla:         mins  06879;    Therapeutic Activity:           mins  40948;     Gait Training:            mins  28757;     Ultrasound:     8     mins  04178;    Electrical Stimulation:    15     mins  75785 ( );  Dry Needling           mins self-pay    Timed Treatment:   41   mins   Total Treatment:     85   mins    Verónica Gray, PT  Physical Therapist

## 2018-09-28 ENCOUNTER — TREATMENT (OUTPATIENT)
Dept: PHYSICAL THERAPY | Facility: CLINIC | Age: 38
End: 2018-09-28

## 2018-09-28 DIAGNOSIS — M54.2 CERVICAL PAIN: ICD-10-CM

## 2018-09-28 DIAGNOSIS — R19.8 CLENCHING OF TEETH: ICD-10-CM

## 2018-09-28 DIAGNOSIS — S76.112D STRAIN OF LEFT QUADRICEPS, SUBSEQUENT ENCOUNTER: ICD-10-CM

## 2018-09-28 DIAGNOSIS — M26.609 TMJ DYSFUNCTION: ICD-10-CM

## 2018-09-28 DIAGNOSIS — R63.8 DIFFICULTY EATING: ICD-10-CM

## 2018-09-28 DIAGNOSIS — R29.898 POPPING OF TEMPOROMANDIBULAR JOINT ON OPENING OF JAW: ICD-10-CM

## 2018-09-28 DIAGNOSIS — S76.111D STRAIN OF RIGHT QUADRICEPS, SUBSEQUENT ENCOUNTER: Primary | ICD-10-CM

## 2018-09-28 PROCEDURE — 97140 MANUAL THERAPY 1/> REGIONS: CPT | Performed by: PHYSICAL THERAPIST

## 2018-09-28 PROCEDURE — 97530 THERAPEUTIC ACTIVITIES: CPT | Performed by: PHYSICAL THERAPIST

## 2018-09-28 PROCEDURE — 97110 THERAPEUTIC EXERCISES: CPT | Performed by: PHYSICAL THERAPIST

## 2018-09-28 PROCEDURE — 97014 ELECTRIC STIMULATION THERAPY: CPT | Performed by: PHYSICAL THERAPIST

## 2018-09-28 NOTE — PROGRESS NOTES
Physical Therapy Daily Progress Note       Visit # : 15  Yamilex Dugan reports: less pain in quads.  No increased tightness since last session.     Subjective     Objective   See Exercise, Manual, and Modality Logs for complete treatment.   Mild knotting in left mid VL but decreased tenderness bilaterally.    Assessment/Plan  Consistently has tolerated progressive quad strengthening with decreased guarding and pain.  Full right knee flexion in prone but limited end range by 5 degrees on left.  Progress per Plan of Care  Gradual progression of strengthening.         Manual Therapy:    10     mins  92186;  Therapeutic Exercise:    30     mins  59757;     Neuromuscular Karla:          mins  85392;    Therapeutic Activity:    8       mins  36048;     Gait Training:            mins  71273;     Ultrasound:    8       mins  33630;    Electrical Stimulation:    15     mins  74612 ( );  Dry Needling          mins self-pay    Timed Treatment:   55   mins   Total Treatment:     85   mins    Verónica Gray, PT  Physical Therapist

## 2018-10-01 ENCOUNTER — TREATMENT (OUTPATIENT)
Dept: PHYSICAL THERAPY | Facility: CLINIC | Age: 38
End: 2018-10-01

## 2018-10-01 DIAGNOSIS — S76.111D STRAIN OF RIGHT QUADRICEPS, SUBSEQUENT ENCOUNTER: Primary | ICD-10-CM

## 2018-10-01 DIAGNOSIS — S76.112D STRAIN OF LEFT QUADRICEPS, SUBSEQUENT ENCOUNTER: ICD-10-CM

## 2018-10-01 PROCEDURE — DRYNDL PR CUSTOM DRY NEEDLING SELF PAY: Performed by: PHYSICAL THERAPIST

## 2018-10-01 NOTE — PROGRESS NOTES
Physical Therapy Daily Progress Note        Visit # : 1  Yamilex Dugan reports: Still have super tight quads - would like to try the Dry Needling     Subjective     Objective       Palpation   Left   Hypertonic in the rectus femoris and vastus lateralis.     Right   Hypertonic in the rectus femoris and vastus lateralis.      See Exercise, Manual, and Modality Logs for complete treatment.       Assessment & Plan     Assessment  Assessment details: Informed and signed consent for Dry Needling obtained. Patient educated in purpose and procedure. Requested patient to apply moist heat again tonight. Improved subjective reports, mobility, and muscle guarding after Dry Needling attempts. Cont PT 1x per week or as indicated by patient for Dry Needling.           Progress strengthening /stabilization /functional activity           Manual Therapy:         mins  34062;  Therapeutic Exercise:         mins  94685;     Neuromuscular Karla:       mins  10898;    Therapeutic Activity:          mins  56672;     Gait Training:           mins  87474;     Ultrasound:          mins  38989;    Electrical Stimulation:        mins  35153 ( );  Dry Needling     15     mins self-pay    Timed Treatment:      mins   Total Treatment:     25   mins    Cassy Rob PT  Physical Therapist  [place] License # [id number]

## 2018-10-03 ENCOUNTER — TREATMENT (OUTPATIENT)
Dept: PHYSICAL THERAPY | Facility: CLINIC | Age: 38
End: 2018-10-03

## 2018-10-03 DIAGNOSIS — R29.898 POPPING OF TEMPOROMANDIBULAR JOINT ON OPENING OF JAW: ICD-10-CM

## 2018-10-03 DIAGNOSIS — R63.8 DIFFICULTY EATING: ICD-10-CM

## 2018-10-03 DIAGNOSIS — R19.8 CLENCHING OF TEETH: ICD-10-CM

## 2018-10-03 DIAGNOSIS — S76.111D STRAIN OF RIGHT QUADRICEPS, SUBSEQUENT ENCOUNTER: Primary | ICD-10-CM

## 2018-10-03 DIAGNOSIS — S76.112D STRAIN OF LEFT QUADRICEPS, SUBSEQUENT ENCOUNTER: ICD-10-CM

## 2018-10-03 DIAGNOSIS — M54.2 CERVICAL PAIN: ICD-10-CM

## 2018-10-03 PROCEDURE — 97140 MANUAL THERAPY 1/> REGIONS: CPT | Performed by: PHYSICAL THERAPIST

## 2018-10-03 PROCEDURE — 97110 THERAPEUTIC EXERCISES: CPT | Performed by: PHYSICAL THERAPIST

## 2018-10-03 PROCEDURE — 97014 ELECTRIC STIMULATION THERAPY: CPT | Performed by: PHYSICAL THERAPIST

## 2018-10-03 NOTE — PROGRESS NOTES
Physical Therapy Daily Progress Note         Visit # : 16  Yamilex Dugan reports: less tightness, just feels weak with going up and down ladders     Subjective     Objective   See Exercise, Manual, and Modality Logs for complete treatment.   Noted two small areas of ecchymosis at proximal vastus lateralis.  Patient late today due to traffic jam, performed more ex at home.     Assessment/Plan  Patient has consistently tolerated increased strengthening for several visits with less pain, less tightness, and improved function.  Progressing well with therapy and dry needling.  Progress per Plan of Care           Manual Therapy:    8     mins  07835;  Therapeutic Exercise:    20    mins  65333;     Neuromuscular Karla:         mins  93541;    Therapeutic Activity:           mins  81002;     Gait Training:            mins  79566;     Ultrasound:     8     mins  15355;    Electrical Stimulation:    15     mins  31262 ( );  Dry Needling           mins self-pay    Timed Treatment:   36   mins   Total Treatment:     65   mins    Verónica Gray, PT  Physical Therapist

## 2018-10-08 ENCOUNTER — TREATMENT (OUTPATIENT)
Dept: PHYSICAL THERAPY | Facility: CLINIC | Age: 38
End: 2018-10-08

## 2018-10-08 DIAGNOSIS — S76.112D STRAIN OF LEFT QUADRICEPS, SUBSEQUENT ENCOUNTER: ICD-10-CM

## 2018-10-08 DIAGNOSIS — R19.8 CLENCHING OF TEETH: ICD-10-CM

## 2018-10-08 DIAGNOSIS — M26.609 TMJ DYSFUNCTION: ICD-10-CM

## 2018-10-08 DIAGNOSIS — R29.898 POPPING OF TEMPOROMANDIBULAR JOINT ON OPENING OF JAW: ICD-10-CM

## 2018-10-08 DIAGNOSIS — M54.2 CERVICAL PAIN: ICD-10-CM

## 2018-10-08 DIAGNOSIS — R63.8 DIFFICULTY EATING: ICD-10-CM

## 2018-10-08 DIAGNOSIS — S76.111D STRAIN OF RIGHT QUADRICEPS, SUBSEQUENT ENCOUNTER: Primary | ICD-10-CM

## 2018-10-08 PROCEDURE — 97530 THERAPEUTIC ACTIVITIES: CPT | Performed by: PHYSICAL THERAPIST

## 2018-10-08 PROCEDURE — 97110 THERAPEUTIC EXERCISES: CPT | Performed by: PHYSICAL THERAPIST

## 2018-10-08 PROCEDURE — 97014 ELECTRIC STIMULATION THERAPY: CPT | Performed by: PHYSICAL THERAPIST

## 2018-10-08 PROCEDURE — 97140 MANUAL THERAPY 1/> REGIONS: CPT | Performed by: PHYSICAL THERAPIST

## 2018-10-08 NOTE — PROGRESS NOTES
Visual Gait Tool  Patient Name: Yamilex Dugan  Diagnosis:   Encounter Diagnoses   Name Primary?   • Strain of right quadriceps, subsequent encounter Yes   • Strain of left quadriceps, subsequent encounter    • Popping of temporomandibular joint on opening of jaw    • Difficulty eating    • Clenching of teeth    • Cervical pain    • TMJ dysfunction                     Left     Frontal View     Right   [x]  Narrow      []  Neutral     []  Wide Step Width [x]  Narrow      []  Neutral     []  Wide   []  Abducted     [x] Cross-over     []  In-line Arm Movement []  Abducted     [x] Cross-over     []  In-line   []  Ipsilateral     [x]  Neutral                     []  Contralateral Trunk []  Ipsilateral     [x]  Neutral                     []  Contralateral   []  Valgus      []   Neutral       [x]   Varus Hip Stability []   Valgus      []   Neutral       [x]   Varus   []  Abd    [x]  Add    []  Neutral   []  ?IR     []  ?ER Dynamic Knee Alignment []  Abd    [x]  Add    []  Neutral   [x]  ?IR     []  ?ER   []  Supinated    []  Neutral                      [x]  Pronated Midstance Pronation []  Supinated    [x]  Neutral                       []  Pronated    Luciana:158       Lateral Plane      [] < 4 cm limited   [] 4-6 cm optimal   [x] > 6 cm increased  Vertical Displacement [] < 4 cm limited   [] 4-6 cm optimal   [x] > 6 cm increased    []  ?anterior     [x]   neutral                      [] posterior Arm Movement []  ?anterior     [x]   neutral                      [] posterior   []  Forward Tilt     [x]  Neutral             []  Backward Torso Orientation []  Forward Tilt     [x]  Neutral             []  Backward   []  Lordosis       [x]  Neutral                    []  Flat Lumbopelvic Posture []  Lordosis       [x]  Neutral                    []  Flat   []  15-20°  normal  [x]   5-15°   limited   []  <0         severe Hip Extension @ Toe Off []  15-20°  normal  [x]   5-15°   limited   []  <0         severe   []   >25°      flexed       []  20-25°  optimal       [x]  <20°      stiff  Knee Excursion @ Stance []  >25°      flexed       []  20-25°  optimal       [x]  <20°      stiff    [x]  Heel      []  Midfoot      []  Forefoot  []  Neutral Contact  []  Anterior to COM Foot Strike Pattern [x]  Heel      []  Midfoot      []  Forefoot  []  Neutral Contact  []  Anterior to COM     Assessment: Please see POC       Physical Therapist: Verónica Gray PT    Date: 10/08/2018

## 2018-10-08 NOTE — PROGRESS NOTES
Physical Therapy Daily Progress Note         Visit # : 17  Yamilex Dugan reports: my quads continue to improve.  Just a little tightness, no pain.    Subjective     Objective   See Exercise, Manual, and Modality Logs for complete treatment.   Hip strength bilaterally 5/5 abd, add, ext, flex  Knee flexion 5/5, extension 5/5 no pain.  Took video of patient running with [employer] lalit.  See running assessment:  Run total 14 minutes: walk 2, run 2 at 5.5, etc.  End with walk  No tenderness at TMJ, full cervical ROM without pain.  Open/closing subluxation still noted at TMJ but no pain.    Assessment/Plan  Improved ROM, pain at cervical and TMJ.  HIp strength improved.  Still mild right knee valgus with single leg squat.  Has heel strike with running, anterior knee with decreased flexion on heel strike, narrow base of support, ncreased vertical due to overuse of gastroc and decreased hip extension.  Will continue to work on improvement of running form as well as LE strength.   Progress per Plan of Care  Swim tomorrow, ok to attempt run/walk as today speed and time later in the week if no increased pain, tightness, or significant issue.         Manual Therapy:    8     mins  14067;  Therapeutic Exercise:    25     mins  08978;     Neuromuscular Karla:         mins  06318;    Therapeutic Activity:     15     mins  05799;     Gait Training:            mins  03211;     Ultrasound:           mins  63712;    Electrical Stimulation:    15     mins  27570 ( );  Dry Needling           mins self-pay    Timed Treatment:   48  mins   Total Treatment:     75   mins    Verónica Gray, PT  Physical Therapist

## 2018-10-31 ENCOUNTER — TREATMENT (OUTPATIENT)
Dept: PHYSICAL THERAPY | Facility: CLINIC | Age: 38
End: 2018-10-31

## 2018-10-31 DIAGNOSIS — S76.112D STRAIN OF LEFT QUADRICEPS, SUBSEQUENT ENCOUNTER: ICD-10-CM

## 2018-10-31 DIAGNOSIS — S76.111D STRAIN OF RIGHT QUADRICEPS, SUBSEQUENT ENCOUNTER: Primary | ICD-10-CM

## 2018-10-31 PROCEDURE — 97530 THERAPEUTIC ACTIVITIES: CPT | Performed by: PHYSICAL THERAPIST

## 2018-10-31 PROCEDURE — 97014 ELECTRIC STIMULATION THERAPY: CPT | Performed by: PHYSICAL THERAPIST

## 2018-10-31 PROCEDURE — 97110 THERAPEUTIC EXERCISES: CPT | Performed by: PHYSICAL THERAPIST

## 2018-11-05 ENCOUNTER — TREATMENT (OUTPATIENT)
Dept: PHYSICAL THERAPY | Facility: CLINIC | Age: 38
End: 2018-11-05

## 2018-11-05 ENCOUNTER — TELEPHONE (OUTPATIENT)
Dept: FAMILY MEDICINE CLINIC | Facility: CLINIC | Age: 38
End: 2018-11-05

## 2018-11-05 DIAGNOSIS — S76.112D STRAIN OF LEFT QUADRICEPS, SUBSEQUENT ENCOUNTER: ICD-10-CM

## 2018-11-05 DIAGNOSIS — M25.529 ELBOW PAIN, UNSPECIFIED LATERALITY: Primary | ICD-10-CM

## 2018-11-05 DIAGNOSIS — S76.111D STRAIN OF RIGHT QUADRICEPS, SUBSEQUENT ENCOUNTER: Primary | ICD-10-CM

## 2018-11-05 PROCEDURE — 97530 THERAPEUTIC ACTIVITIES: CPT | Performed by: PHYSICAL THERAPIST

## 2018-11-05 PROCEDURE — 97014 ELECTRIC STIMULATION THERAPY: CPT | Performed by: PHYSICAL THERAPIST

## 2018-11-05 PROCEDURE — 97110 THERAPEUTIC EXERCISES: CPT | Performed by: PHYSICAL THERAPIST

## 2018-11-05 NOTE — TELEPHONE ENCOUNTER
Pt called and said she has been seeing a PT, she would now like the PT to look at her elbow issue that she told you about before. But she needs referral for PT to evaluate her elbow issue.     Ok new elbow PT orders entered    Enrique Hankins MD

## 2018-11-05 NOTE — PROGRESS NOTES
Physical Therapy Daily Progress Note       Visit # : 19  Yamilex Dugan reports: no soreness after adding new exercises.  Quads are feeling better.  Also having right elbow pain.  Hit it before break and thought it would get better but continues with pain and numbness into 4/5 fingers.    Subjective     Objective   See Exercise, Manual, and Modality Logs for complete treatment.   Running/gait training:  Stride length, number of steps per minute (160 with metronome), heel strike position and foot position.    Assessment/Plan  Improved running form noted today.  Still with heel strike but improved angle of knee on strike.  More hip extension noted and increased berta to 160 steps per minute.  Quad strengthening without increased pain today.  Progress per Plan of Care  Add sit to stand, weight shifts.         Manual Therapy:          mins  62464;  Therapeutic Exercise:    30     mins  02018;     Neuromuscular Karla:         mins  12491;    Therapeutic Activity:    15       mins  30501;     Gait Training:            mins  11426;     Ultrasound:           mins  96318;    Electrical Stimulation:    15     mins  24771 ( );  Dry Needling           mins self-pay    Timed Treatment:   45   mins   Total Treatment:     75   mins    Verónica Gray, PT  Physical Therapist

## 2018-11-07 ENCOUNTER — TREATMENT (OUTPATIENT)
Dept: PHYSICAL THERAPY | Facility: CLINIC | Age: 38
End: 2018-11-07

## 2018-11-07 DIAGNOSIS — M79.609 PARESTHESIA AND PAIN OF RIGHT EXTREMITY: ICD-10-CM

## 2018-11-07 DIAGNOSIS — R20.2 PARESTHESIA AND PAIN OF RIGHT EXTREMITY: ICD-10-CM

## 2018-11-07 DIAGNOSIS — M77.01 GOLFER'S ELBOW, RIGHT: ICD-10-CM

## 2018-11-07 DIAGNOSIS — S76.112D STRAIN OF LEFT QUADRICEPS, SUBSEQUENT ENCOUNTER: ICD-10-CM

## 2018-11-07 DIAGNOSIS — S76.111D STRAIN OF RIGHT QUADRICEPS, SUBSEQUENT ENCOUNTER: Primary | ICD-10-CM

## 2018-11-07 PROCEDURE — G8984 CARRY CURRENT STATUS: HCPCS | Performed by: PHYSICAL THERAPIST

## 2018-11-07 PROCEDURE — 97140 MANUAL THERAPY 1/> REGIONS: CPT | Performed by: PHYSICAL THERAPIST

## 2018-11-07 PROCEDURE — G8985 CARRY GOAL STATUS: HCPCS | Performed by: PHYSICAL THERAPIST

## 2018-11-07 PROCEDURE — 97161 PT EVAL LOW COMPLEX 20 MIN: CPT | Performed by: PHYSICAL THERAPIST

## 2018-11-07 PROCEDURE — 97110 THERAPEUTIC EXERCISES: CPT | Performed by: PHYSICAL THERAPIST

## 2018-11-07 PROCEDURE — 97014 ELECTRIC STIMULATION THERAPY: CPT | Performed by: PHYSICAL THERAPIST

## 2018-11-07 NOTE — PROGRESS NOTES
Physical Therapy Initial Evaluation and Plan of Care    TIME IN 3:00 TIME OUT 4:20    Patient: Yamilex Dugan   : 1980  Diagnosis/ICD-10 Code:  Strain of right quadriceps, subsequent encounter [S76.111D]  Referring practitioner: Enrique Hankins MD  Date of Initial Visit: 2018  Today's Date: 2018  Patient seen for 20 sessions           Subjective Questionnaire: QuickDASH:        Subjective Evaluation    History of Present Illness  Date of onset: 10/13/2018  Mechanism of injury: Hit elbow on a door frame when I was cleaning.  Immediate pain in elbow.  Next day very sore and started feeling numbness and tingling in 4/5th fingers.    Subjective comment: No prior injury, some tennis elbow in the past.  Patient Occupation: [employer]  Current pain ratin  At worst pain ratin  Location: right elbow to pinky, constant pain  Quality: dull ache  Relieving factors: rest and heat  Exacerbated by: leaning on elbow, using, lifting things up.  Progression: no change    Hand dominance: right    Diagnostic Tests  No diagnostic tests performed    Patient Goals  Patient goals for therapy: decreased pain, increased motion and return to sport/leisure activities             Objective       Observations     Additional Observation Details  No edema noted.    Palpation     Right Tenderness of the wrist flexors.     Tenderness     Right Elbow   Tenderness in the cubital tunnel and medial epicondyle.     Right Wrist/Hand   Tenderness in the medial epicondyle.     Neurological Testing     Sensation     Elbow   Left Elbow   Intact: light touch    Right Elbow   Intact: light touch    Comments   Right light touch: hypersensitive along right ulnar nerve distribution.     Active Range of Motion     Left Elbow   Normal active range of motion    Right Elbow   Normal active range of motion  Flexion: with pain  Extension: with pain    Passive Range of Motion     Right Elbow   Flexion: WFL and with pain  Extension:  WFL and with pain  Forearm supination: WFL and with pain  Forearm pronation: WFL    Strength/Myotome Testing     Right Elbow   Flexion: 5  Extension: 4+  Forearm supination: 4+  Forearm pronation: 4+    Left Wrist/Hand   Normal wrist strength     (2nd hand position)     Trial 1: 55 lbs    Trial 2: 55 lbs    Trial 3: 54 lbs    Average: 54.67 lbs    Right Wrist/Hand   Wrist extension: 5  Wrist flexion: 4+  Ulnar deviation: 4 (pain)     (2nd hand position)     Trial 1: 48 lbs    Trial 2: 48 lbs    Trial 3: 48 lbs    Average: 48 lbs    Comments: Pain        Additional Strength Details     FDP 4/5, FDS 4/5    Tests     Right Elbow   Positive active medial epicondylitis, passive medial epicondylitis and Tinel's sign (cubital tunnel).          Assessment & Plan     Assessment  Impairments: activity intolerance, impaired physical strength, pain with function and weight-bearing intolerance  Assessment details: Patient is a 37 yo female that injured her elbow when she hit it on a door frame a few weeks ago.  She has had local tenderness with paresthesias.  She has pain with end range of motion and mild weakness in the elbow and wrist.  Based on the above findings, she will benefit from therapy to decrease pain and tenderness, resolve paresthesias and restore her susanne UE strength.  Prognosis: good  Prognosis details:    Functional Limitations: carrying objects, lifting, pulling, pushing and uncomfortable because of pain  Goals  Plan Goals: STG X 3 weeks  1.  Painfree ROM.  2.  25% reduction in paresthesias.  3.  Increase  on right to 50lbs.  LTG X 6 weeks.  1.  5/5 strength in right elbow.  2.  Tolerate pressure on elbow with work and athletics.  3.  50% reduction in paresthesias.      Plan  Therapy options: will be seen for skilled physical therapy services  Planned modality interventions: cryotherapy, iontophoresis, ultrasound, thermotherapy (paraffin bath) and thermotherapy (hydrocollator packs)  Planned  therapy interventions: manual therapy, stretching and strengthening  Frequency: 2x week  Duration in visits: 8  Treatment plan discussed with: patient        Manual Therapy:          mins  88359;  Therapeutic Exercise:    5     mins  25102;     Neuromuscular Karla:         mins  44690;    Therapeutic Activity:           mins  19369;     Gait Training:            mins  21620;     Ultrasound:     6/8     mins  71528;    Electrical Stimulation:          mins  51746 ( );  Dry Needling           mins self-pay    Timed Treatment:   13   mins   Total Treatment:     30   mins    PT SIGNATURE: Verónica Gray, PT   DATE TREATMENT INITIATED: 11/7/2018    Initial Certification  Certification Period: 2/5/2019  I certify that the therapy services are furnished while this patient is under my care.  The services outlined above are required by this patient, and will be reviewed every 90 days.     PHYSICIAN: Enrique Hankins MD      DATE:     Please sign and return via fax to [phone].. Thank you, [facility].

## 2018-11-07 NOTE — PROGRESS NOTES
Physical Therapy Daily Progress Note         Visit # : 20  Yamilex Dugan reports: quads felt good after running in the clinic.  I did a workout class at the gym and fell my quads a lot.  Tight but not pain like they were.    Subjective     Objective   See Exercise, Manual, and Modality Logs for complete treatment.   Increased tenderness bilateral vastus lateralis but no increased tone.    Assessment/Plan  Deferred squatting exercises today due to tenderness and soreness.  No loss of ROM but still fatigue with full stretch and ballistic movements.    Progress per Plan of Care continue to progress with dynamic exercises.            Manual Therapy:    8     mins  86039;  Therapeutic Exercise:    30     mins  63135;     Neuromuscular Karla:         mins  38390;    Therapeutic Activity:           mins  13980;     Gait Training:            mins  31823;     Ultrasound:           mins  95698;    Electrical Stimulation:    15     mins  40360 ( );  Dry Needling           mins self-pay    Timed Treatment:   38   mins   Total Treatment:     90   mins    Verónica Gray, PT  Physical Therapist

## 2018-11-12 ENCOUNTER — TREATMENT (OUTPATIENT)
Dept: PHYSICAL THERAPY | Facility: CLINIC | Age: 38
End: 2018-11-12

## 2018-11-12 DIAGNOSIS — M77.01 GOLFER'S ELBOW, RIGHT: ICD-10-CM

## 2018-11-12 DIAGNOSIS — S76.111D STRAIN OF RIGHT QUADRICEPS, SUBSEQUENT ENCOUNTER: Primary | ICD-10-CM

## 2018-11-12 DIAGNOSIS — S76.112D STRAIN OF LEFT QUADRICEPS, SUBSEQUENT ENCOUNTER: ICD-10-CM

## 2018-11-12 DIAGNOSIS — M79.609 PARESTHESIA AND PAIN OF RIGHT EXTREMITY: ICD-10-CM

## 2018-11-12 DIAGNOSIS — R20.2 PARESTHESIA AND PAIN OF RIGHT EXTREMITY: ICD-10-CM

## 2018-11-12 PROCEDURE — 97110 THERAPEUTIC EXERCISES: CPT | Performed by: PHYSICAL THERAPIST

## 2018-11-12 PROCEDURE — 97035 APP MDLTY 1+ULTRASOUND EA 15: CPT | Performed by: PHYSICAL THERAPIST

## 2018-11-12 PROCEDURE — 97014 ELECTRIC STIMULATION THERAPY: CPT | Performed by: PHYSICAL THERAPIST

## 2018-11-12 NOTE — PROGRESS NOTES
Physical Therapy Daily Progress Note         Visit # : 21  Yamilex Dugan reports: elbow much better with ultrasound.  I rested my quads since I saw you last and they feel good today.    Subjective     Objective   See Exercise, Manual, and Modality Logs for complete treatment.   Decreased vastus lateralis tenderness    Assessment/Plan  Improved tenderness and paresthesias at elbow.  Able to resume quad strengthening today with no c/o pain.    Progress strengthening /stabilization /functional activity  Add dynamic exercises if quads continue to improve.  Kneeling palloff press next visit.       Manual Therapy:          mins  87011;  Therapeutic Exercise:    25     mins  36739;     Neuromuscular Karla:         mins  00673;    Therapeutic Activity:           mins  58519;     Gait Training:            mins  16348;     Ultrasound:     6/8     mins  68908;    Electrical Stimulation:    15     mins  33041 ( );  Dry Needling           mins self-pay    Timed Treatment:   45   mins   Total Treatment:     64   mins    Verónica Gray, PT  Physical Therapist

## 2018-11-14 ENCOUNTER — TREATMENT (OUTPATIENT)
Dept: PHYSICAL THERAPY | Facility: CLINIC | Age: 38
End: 2018-11-14

## 2018-11-14 DIAGNOSIS — M77.01 GOLFER'S ELBOW, RIGHT: ICD-10-CM

## 2018-11-14 DIAGNOSIS — S76.111D STRAIN OF RIGHT QUADRICEPS, SUBSEQUENT ENCOUNTER: Primary | ICD-10-CM

## 2018-11-14 DIAGNOSIS — M79.609 PARESTHESIA AND PAIN OF RIGHT EXTREMITY: ICD-10-CM

## 2018-11-14 DIAGNOSIS — S76.112D STRAIN OF LEFT QUADRICEPS, SUBSEQUENT ENCOUNTER: ICD-10-CM

## 2018-11-14 DIAGNOSIS — R20.2 PARESTHESIA AND PAIN OF RIGHT EXTREMITY: ICD-10-CM

## 2018-11-14 PROCEDURE — 97014 ELECTRIC STIMULATION THERAPY: CPT | Performed by: PHYSICAL THERAPIST

## 2018-11-14 PROCEDURE — 97035 APP MDLTY 1+ULTRASOUND EA 15: CPT | Performed by: PHYSICAL THERAPIST

## 2018-11-14 NOTE — PROGRESS NOTES
Physical Therapy Daily Progress Note       Visit # : 22  Yamilex Dugan reports: worked out legs today.  Tightness noted but not back to the way it was. Elbow is still sore with tingling.    Subjective     Objective   See Exercise, Manual, and Modality Logs for complete treatment.       Assessment/Plan  Abbreviated treatment today as patient was 40 minutes late and had already done her exercises at the gym. Increased tenderness at bilateral vastus lateralis.  Progress strengthening /stabilization /functional activity  Return to strengthening, progress to dynamic as able.         Manual Therapy:          mins  92024;  Therapeutic Exercise:   7       mins  95218;     Neuromuscular Karla:         mins  31450;    Therapeutic Activity:           mins  45844;     Gait Training:            mins  63720;     Ultrasound:     6/8     mins  53477;    Electrical Stimulation:    15     mins  40247 ( );  Dry Needling           mins self-pay    Timed Treatment:   15   mins   Total Treatment:     45   mins    Verónica Gray, PT  Physical Therapist

## 2018-11-19 ENCOUNTER — TREATMENT (OUTPATIENT)
Dept: PHYSICAL THERAPY | Facility: CLINIC | Age: 38
End: 2018-11-19

## 2018-11-19 DIAGNOSIS — R20.2 PARESTHESIA AND PAIN OF RIGHT EXTREMITY: ICD-10-CM

## 2018-11-19 DIAGNOSIS — M79.609 PARESTHESIA AND PAIN OF RIGHT EXTREMITY: ICD-10-CM

## 2018-11-19 DIAGNOSIS — S76.111D STRAIN OF RIGHT QUADRICEPS, SUBSEQUENT ENCOUNTER: Primary | ICD-10-CM

## 2018-11-19 DIAGNOSIS — S76.112D STRAIN OF LEFT QUADRICEPS, SUBSEQUENT ENCOUNTER: ICD-10-CM

## 2018-11-19 DIAGNOSIS — M77.01 GOLFER'S ELBOW, RIGHT: ICD-10-CM

## 2018-11-19 PROCEDURE — 97110 THERAPEUTIC EXERCISES: CPT | Performed by: PHYSICAL THERAPIST

## 2018-11-19 PROCEDURE — 97035 APP MDLTY 1+ULTRASOUND EA 15: CPT | Performed by: PHYSICAL THERAPIST

## 2018-11-19 PROCEDURE — 97530 THERAPEUTIC ACTIVITIES: CPT | Performed by: PHYSICAL THERAPIST

## 2018-11-19 PROCEDURE — 97014 ELECTRIC STIMULATION THERAPY: CPT | Performed by: PHYSICAL THERAPIST

## 2018-11-19 NOTE — PROGRESS NOTES
Physical Therapy Daily Progress Note         Visit # : 23  Yamilex Dugan reports: haven't worked out much so quads feel good.  Elbow still sensitve.    Subjective     Objective   See Exercise, Manual, and Modality Logs for complete treatment.   Pt education:  Healing times for nerve involvement.    Assessment/Plan    Progress per Plan of Care  Approval for elbow, add ionto next visit.  Assess use of agility and small jumps.  Progress to squat and jump next visit if tolerated.  Add RDL walks       Manual Therapy:          mins  32507;  Therapeutic Exercise:    30     mins  15725;     Neuromuscular Karla:    7    mins  75366;    Therapeutic Activity:      10     mins  68954;     Gait Training:            mins  06700;     Ultrasound:      8     mins  12633;    Electrical Stimulation:    15     mins  17134 ( );  Dry Needling           mins self-pay    Timed Treatment:   55   mins   Total Treatment:     85   mins    Verónica Gray, PT  Physical Therapist

## 2018-11-21 ENCOUNTER — TREATMENT (OUTPATIENT)
Dept: PHYSICAL THERAPY | Facility: CLINIC | Age: 38
End: 2018-11-21

## 2018-11-21 DIAGNOSIS — M77.01 GOLFER'S ELBOW, RIGHT: ICD-10-CM

## 2018-11-21 DIAGNOSIS — R20.2 PARESTHESIA AND PAIN OF RIGHT EXTREMITY: ICD-10-CM

## 2018-11-21 DIAGNOSIS — S76.111D STRAIN OF RIGHT QUADRICEPS, SUBSEQUENT ENCOUNTER: Primary | ICD-10-CM

## 2018-11-21 DIAGNOSIS — M79.609 PARESTHESIA AND PAIN OF RIGHT EXTREMITY: ICD-10-CM

## 2018-11-21 DIAGNOSIS — S76.112D STRAIN OF LEFT QUADRICEPS, SUBSEQUENT ENCOUNTER: ICD-10-CM

## 2018-11-21 PROCEDURE — 97112 NEUROMUSCULAR REEDUCATION: CPT | Performed by: PHYSICAL THERAPIST

## 2018-11-21 PROCEDURE — 97110 THERAPEUTIC EXERCISES: CPT | Performed by: PHYSICAL THERAPIST

## 2018-11-21 PROCEDURE — 97014 ELECTRIC STIMULATION THERAPY: CPT | Performed by: PHYSICAL THERAPIST

## 2018-11-21 PROCEDURE — 97035 APP MDLTY 1+ULTRASOUND EA 15: CPT | Performed by: PHYSICAL THERAPIST

## 2018-11-21 NOTE — PROGRESS NOTES
Physical Therapy Daily Progress Note       Visit # : 24  Yamilexchikis Dugan reports: quads felt good after last treatment.  Elbow continues with soreness.    Subjective     Objective   See Exercise, Manual, and Modality Logs for complete treatment.   Tender medial epicondyle.  (-) Golfer's elbow testing  No pain with passive wrist stretching.    Assessment/Plan  Ionto will be helpful to add to treatment for local tenderness.  Able to tolerate light stretching and strengthening.  Quads progressing well.  Progress strengthening /stabilization /functional activity           Manual Therapy:          mins  93080;  Therapeutic Exercise:    30     mins  34573;     Neuromuscular Karla:    15    mins  29387;    Therapeutic Activity:           mins  09411;     Gait Training:            mins  99600;     Ultrasound:     6/8     mins  71177;    Electrical Stimulation:    15     mins  15716 ( );  Dry Needling           mins self-pay    Timed Treatment:   53   mins   Total Treatment:     70   mins    Verónica Gray, PT  Physical Therapist

## 2018-11-28 ENCOUNTER — TREATMENT (OUTPATIENT)
Dept: PHYSICAL THERAPY | Facility: CLINIC | Age: 38
End: 2018-11-28

## 2018-11-28 DIAGNOSIS — R20.2 PARESTHESIA AND PAIN OF RIGHT EXTREMITY: ICD-10-CM

## 2018-11-28 DIAGNOSIS — S76.112D STRAIN OF LEFT QUADRICEPS, SUBSEQUENT ENCOUNTER: ICD-10-CM

## 2018-11-28 DIAGNOSIS — M79.609 PARESTHESIA AND PAIN OF RIGHT EXTREMITY: ICD-10-CM

## 2018-11-28 DIAGNOSIS — M77.01 GOLFER'S ELBOW, RIGHT: ICD-10-CM

## 2018-11-28 DIAGNOSIS — S76.111D STRAIN OF RIGHT QUADRICEPS, SUBSEQUENT ENCOUNTER: Primary | ICD-10-CM

## 2018-11-28 PROCEDURE — 97110 THERAPEUTIC EXERCISES: CPT | Performed by: PHYSICAL THERAPIST

## 2018-11-28 PROCEDURE — 97035 APP MDLTY 1+ULTRASOUND EA 15: CPT | Performed by: PHYSICAL THERAPIST

## 2018-11-28 PROCEDURE — 97014 ELECTRIC STIMULATION THERAPY: CPT | Performed by: PHYSICAL THERAPIST

## 2018-11-28 PROCEDURE — 97530 THERAPEUTIC ACTIVITIES: CPT | Performed by: PHYSICAL THERAPIST

## 2018-11-28 NOTE — PROGRESS NOTES
Physical Therapy Daily Progress Note         Visit # : 25  Yamilex Dugan reports: elbow worse over the long weekend without treatment.  Ran a mile, did light workout, quads feel good but still weak.    Subjective     Objective   See Exercise, Manual, and Modality Logs for complete treatment.   Decreased quad tenderness at VL  Mild ttp at medial epicondyle < ulnar groove    Assessment/Plan  Strength progressing with improved tolerance of dynamic strengthening.   Progress strengthening /stabilization /functional activity           Manual Therapy:          mins  35235;  Therapeutic Exercise:    20      mins  24508;     Neuromuscular Karla:         mins  93119;    Therapeutic Activity:      10     mins  83158;     Gait Training:            mins  73097;     Ultrasound:     8     mins  94764;    Electrical Stimulation:    15     mins  26342 ( );  Dry Needling           mins self-pay    Timed Treatment:   38   mins   Total Treatment:   70     mins    Verónica Gray, PT  Physical Therapist

## 2018-12-07 ENCOUNTER — TREATMENT (OUTPATIENT)
Dept: PHYSICAL THERAPY | Facility: CLINIC | Age: 38
End: 2018-12-07

## 2018-12-07 DIAGNOSIS — S76.112D STRAIN OF LEFT QUADRICEPS, SUBSEQUENT ENCOUNTER: ICD-10-CM

## 2018-12-07 DIAGNOSIS — S76.111D STRAIN OF RIGHT QUADRICEPS, SUBSEQUENT ENCOUNTER: Primary | ICD-10-CM

## 2018-12-07 DIAGNOSIS — M77.01 GOLFER'S ELBOW, RIGHT: ICD-10-CM

## 2018-12-07 PROCEDURE — 97110 THERAPEUTIC EXERCISES: CPT | Performed by: PHYSICAL THERAPIST

## 2018-12-07 PROCEDURE — 97035 APP MDLTY 1+ULTRASOUND EA 15: CPT | Performed by: PHYSICAL THERAPIST

## 2018-12-07 PROCEDURE — 97014 ELECTRIC STIMULATION THERAPY: CPT | Performed by: PHYSICAL THERAPIST

## 2018-12-07 NOTE — PROGRESS NOTES
Physical Therapy Daily Progress Note    Visit # : 7  Yamilex Dugan reports: she has already done her exercises at the gym this morning for her legs, but she is wanting to do the heat and stim.  Pt states her elbow is feeling better, but it is still really sensitive to touch on the inside.      Subjective     Objective       Tenderness     Right Elbow   Tenderness in the medial epicondyle.     Right Wrist/Hand   Tenderness in the medial epicondyle.     Additional Tenderness Details  Right ulnar nerve      See Exercise, Manual, and Modality Logs for complete treatment.       Assessment & Plan     Assessment  Assessment details: Pt tolerated treatment well and had greater than 90 degrees of shoulder flex before she felt any nerve symptoms during nerve glides.  Pt was able to increase weight and do pron/sup without increased symptoms.  Will continue to see pt 1-2/week for strengthening, stretching and modalities PRN for pain.          Progress per Plan of Care           Manual Therapy:         mins  89241;  Therapeutic Exercise:   20      mins  13414;     Neuromuscular Karla:        mins  67007;    Therapeutic Activity:          mins  31550;     Gait Training:           mins  31753;     Ultrasound:    6/8      mins  78542;    Electrical Stimulation:   15      mins  95551 ( );  Dry Needling          mins self-pay    Timed Treatment:  28    mins   Total Treatment:     47  mins    Mahnaz Patel, PT  Physical Therapist

## 2018-12-12 ENCOUNTER — TREATMENT (OUTPATIENT)
Dept: PHYSICAL THERAPY | Facility: CLINIC | Age: 38
End: 2018-12-12

## 2018-12-12 DIAGNOSIS — S76.112D STRAIN OF LEFT QUADRICEPS, SUBSEQUENT ENCOUNTER: ICD-10-CM

## 2018-12-12 DIAGNOSIS — S76.111D STRAIN OF RIGHT QUADRICEPS, SUBSEQUENT ENCOUNTER: ICD-10-CM

## 2018-12-12 DIAGNOSIS — R20.2 PARESTHESIA AND PAIN OF RIGHT EXTREMITY: ICD-10-CM

## 2018-12-12 DIAGNOSIS — M77.01 GOLFER'S ELBOW, RIGHT: Primary | ICD-10-CM

## 2018-12-12 DIAGNOSIS — M79.609 PARESTHESIA AND PAIN OF RIGHT EXTREMITY: ICD-10-CM

## 2018-12-12 PROCEDURE — 97035 APP MDLTY 1+ULTRASOUND EA 15: CPT | Performed by: PHYSICAL THERAPIST

## 2018-12-12 PROCEDURE — 97140 MANUAL THERAPY 1/> REGIONS: CPT | Performed by: PHYSICAL THERAPIST

## 2018-12-12 PROCEDURE — 97014 ELECTRIC STIMULATION THERAPY: CPT | Performed by: PHYSICAL THERAPIST

## 2018-12-12 PROCEDURE — 97110 THERAPEUTIC EXERCISES: CPT | Performed by: PHYSICAL THERAPIST

## 2018-12-12 NOTE — PROGRESS NOTES
Re-Assessment / Re-Certification         Patient: Yamilex Dugan   : 1980  Diagnosis/ICD-10 Code:  Macs elbow, right [M77.01]  Referring practitioner: Enrique Hankins MD  Date of Initial Visit: 2018  Today's Date: 2018  Patient seen for 27 sessions      Subjective:   Yamilex Dugan reports: improved pain.  Subjective Questionnaire: QuickDASH:    Clinical Progress: improved  Home Program Compliance: Yes  Treatment has included: therapeutic exercise, neuromuscular re-education, manual therapy, electrical stimulation, moist heat and parafin    Subjective   Objective       Palpation     Right Tenderness of the wrist flexors.     Neurological Testing     Sensation     Elbow     Right Elbow   Diminished: light touch    Comments   Right light touch: medial forearm slight, 5th finger, medial 4th finger.     Active Range of Motion     Left Elbow   Flexion: 148 degrees   Extension: 0 degrees   Forearm supination: 70 degrees     Right Elbow   Flexion: 140 degrees   Extension: 0 degrees   Forearm supination: 60 degrees     Strength/Myotome Testing     Left Elbow   Flexion: 5  Extension: 5    Right Elbow   Flexion: 5  Extension: 4+    Right Wrist/Hand      (2nd hand position)     Trial 1: 44 lbs    Trial 2: 46 lbs    Trial 3: 46 lbs    Average: 45.33 lbs    Tests     Right Elbow   Positive active medial epicondylitis.      Assessment/Plan   Improved ROM, less pain with strength testing.  Still with weakness with  as patient not using right as much due to pain.  Able to initiate  strengthening today.  Progress toward previous goals: Partially Met    New Goals  Short-term goals (STG): continue  Long-term goals (LTG): continue      Recommendations: Continue as planned  Timeframe: 2 weeks then HEP  Prognosis to achieve goals: good    PT Signature: Verónica Gray, PT      Based upon review of the patient's progress and continued therapy plan, it is my medical opinion that Yamilex Dugan  should continue physical therapy treatment at [facility]  [address] [facility] [address] [phone].    Signature: __________________________________  Enrique Hankins MD    Manual Therapy:    10     mins  98667;  Therapeutic Exercise:    25     mins  97292;     Neuromuscular Karla:         mins  03121;    Therapeutic Activity:     10      mins  69842;     Gait Training:            mins  83053;     Ultrasound:     8     mins  32421;    Electrical Stimulation:    15     mins  65655 ( );  Dry Needling           mins self-pay    Timed Treatment:   48   mins   Total Treatment:     68   mins

## 2019-01-14 ENCOUNTER — TREATMENT (OUTPATIENT)
Dept: PHYSICAL THERAPY | Facility: CLINIC | Age: 39
End: 2019-01-14

## 2019-01-14 DIAGNOSIS — M77.01 GOLFER'S ELBOW, RIGHT: Primary | ICD-10-CM

## 2019-01-14 PROCEDURE — 97530 THERAPEUTIC ACTIVITIES: CPT | Performed by: PHYSICAL THERAPIST

## 2019-01-14 PROCEDURE — 97140 MANUAL THERAPY 1/> REGIONS: CPT | Performed by: PHYSICAL THERAPIST

## 2019-01-14 PROCEDURE — 97014 ELECTRIC STIMULATION THERAPY: CPT | Performed by: PHYSICAL THERAPIST

## 2019-01-14 PROCEDURE — 97110 THERAPEUTIC EXERCISES: CPT | Performed by: PHYSICAL THERAPIST

## 2019-01-14 NOTE — PROGRESS NOTES
"Re-Assessment / Re-Certification       Patient: Yamilex Dugan   : 1980  Diagnosis/ICD-10 Code:  Macs elbow, right [M77.01]  Referring practitioner: Enrique Hankins MD  Date of Initial Visit: 2019  Today's Date: 2019  Patient seen for 9 sessions      Subjective:   Yamilex Dugan reports: elbow is better.  Wearing elbow strap and that has helped a lot with pain.  Less numbness but still occurs.  Pain more on outside of elbow now  Subjective Questionnaire: QuickDASH: 20%  Clinical Progress: improved  Home Program Compliance: Yes  Treatment has included: therapeutic exercise, neuromuscular re-education, manual therapy, ultrasound and moist heat    Subjective   Objective       Observations     Right Elbow   Negative for edema.     Palpation     Right Tenderness of the wrist extensors.     Tenderness     Right Elbow   Tenderness in the cubital tunnel and lateral epicondyle.     Right Wrist/Hand   Tenderness in the lateral epicondyle.     Neurological Testing     Sensation     Elbow   Left Elbow   Intact: light touch    Right Elbow   Intact: light touch    Wrist/Hand     Right   Diminished: light touch    Comments   Right light touch: right 5th finger    Active Range of Motion     Left Elbow   Normal active range of motion    Right Elbow   Normal active range of motion    Right Wrist   Normal active range of motion  Wrist flexion: Right wrist active flexion: slight end range pain lateral. with pain  Wrist extension: Right wrist active extension: slight end range pain lateral.     Passive Range of Motion     Left Elbow   Normal passive range of motion    Right Elbow   Normal passive range of motion  Forearm pronation: Right elbow passive pronation: mild pain perceived per patient \"if I try to go any further\"     Joint Play     Right Elbow   Joints within functional limits are the humeroulnar joint, humeroradial joint and proximal radioulnar joint.     Strength/Myotome Testing     Left Elbow "   Normal strength    Right Elbow   Flexion: 5  Extension: 5  Forearm supination: 5  Forearm pronation: 5    Right Wrist/Hand   Right wrist extension strength: 5-/5, no pain.  Wrist flexion: 5  Radial deviation: 5  Right wrist ulnar deviation strength: 5-/5, no pain.     (2nd hand position)     Trial 1: 48 lbs    Trial 2: 42 lbs    Trial 3: 45 lbs    Average: 45 lbs    Tests     Right Elbow   Positive active medial epicondylitis and Tinel's sign (cubital tunnel).   Negative passive medial epicondylitis.     Swelling     Right Elbow Girth Measurements   Girth at joint line (cm): no edema noted.     Assessment/Plan  Progress toward previous goals: Partially Met         Recommendations: Continue with recommendations one more visit then HEP  Timeframe: 1 week  Prognosis to achieve goals: good    PT Signature: Verónica Gray, PT      Based upon review of the patient's progress and continued therapy plan, it is my medical opinion that Yamilex Dugan should continue physical therapy treatment at [facility] PHYS[facility] THERAP[facility]  [address] [place] [id number] [phone].    Signature: __________________________________  Enrique Hankins MD    Manual Therapy:    15     mins  74493;  Therapeutic Exercise:   8     mins  02452;     Neuromuscular Karla:         mins  56842;    Therapeutic Activity:     10     mins  50185;     Gait Training:            mins  97979;     Ultrasound:     10     mins  99086;    Electrical Stimulation:    15     mins  56741 ( );  Dry Needling           mins self-pay    Timed Treatment:   43   mins   Total Treatment:     70   mins

## 2020-05-05 NOTE — PROGRESS NOTES
"  PICC Line Instructions    How to Care for your PICC  • Do not take a bath, swim, or use hot tubs when you have a PICC. Cover PICC line with clear plastic wrap and tape to keep it dry while showering  • Check the PICC insertion site daily for leakage, redness, swelling, or pain.  • Flush the PICC as directed by your health care provider. Let your health care provider know right away if the PICC is difficult to flush or does not flush. Do not use force to flush the PICC.  • Do not use a syringe that is less than 10 mL to flush the PICC  • Avoid blood pressure checks on the arm with the PICC.  • Do not take the PICC out yourself. Only a trained clinical professional should remove the PICC  • Make sure you or anyone who access your PICC Line washes their hands before using the line  • Make sure the hub of the line is \"scrubbed\" prior to using the line  Dressing Changes  • Change the PICC dressing as instructed by your health care provider.  • Change your PICC dressing if it becomes loose or wet.  When to seek medical attention  • PICC is accidentally pulled all the way out  • There is any type of drainage, redness, or swelling where the PICC enters the skin.  • You cannot flush the PICC, it is difficult to flush, or the PICC leaks around the insertion site when it is flushed.  • You hear a \"flushing\" sound when the PICC is flushed.  • You notice a hole or tear in the PICC.  • You develop chills or a fever      " Physical Therapy Daily Progress Note    Time In 4:00  Time Out 5:06    Yamilex Dugan reports: My hamstring is definitely feeling better - think I may attempt a light jog tomm    Subjective     Objective     Palpation     Right Tenderness of the gluteus medius and piriformis.      See Exercise, Manual, and Modality Logs for complete treatment.       Assessment & Plan     Assessment  Assessment details: Improved muscle tone and subjective reports. Deficits persist in decreased strength and tolerance to functional activity - i.e. Running. Cont PT 1-2x per week for flexibility and strengthening - next visit attempt bridges and or hip machine if tolerated- may discuss Dry Needling.         Progress strengthening /stabilization /functional activity           Manual Therapy:    8     mins  34625;  Therapeutic Exercise:   21     mins  88489;     Neuromuscular Karla:        mins  33322;    Therapeutic Activity:     10     mins  34299;     Gait Training:           mins  29612;     Ultrasound:    8     mins  67336;    Electrical Stimulation:    15     mins  78923 ( );  Dry Needling          mins self-pay    Timed Treatment:   47   mins   Total Treatment:     62   mins    Cassy Wilde, PT  Physical Therapist  [place] License # [id number]